# Patient Record
Sex: FEMALE | Race: BLACK OR AFRICAN AMERICAN | NOT HISPANIC OR LATINO | Employment: FULL TIME | URBAN - METROPOLITAN AREA
[De-identification: names, ages, dates, MRNs, and addresses within clinical notes are randomized per-mention and may not be internally consistent; named-entity substitution may affect disease eponyms.]

---

## 2019-04-27 ENCOUNTER — OFFICE VISIT (OUTPATIENT)
Dept: URGENT CARE | Facility: URGENT CARE | Age: 19
End: 2019-04-27
Payer: COMMERCIAL

## 2019-04-27 VITALS
SYSTOLIC BLOOD PRESSURE: 123 MMHG | WEIGHT: 293 LBS | DIASTOLIC BLOOD PRESSURE: 65 MMHG | OXYGEN SATURATION: 98 % | HEART RATE: 90 BPM | TEMPERATURE: 98.8 F

## 2019-04-27 DIAGNOSIS — B37.2 CANDIDIASIS OF SKIN: Primary | ICD-10-CM

## 2019-04-27 PROCEDURE — 99203 OFFICE O/P NEW LOW 30 MIN: CPT | Performed by: FAMILY MEDICINE

## 2019-04-27 RX ORDER — NYSTATIN 100000 U/G
CREAM TOPICAL 2 TIMES DAILY
Qty: 30 G | Refills: 11 | Status: SHIPPED | OUTPATIENT
Start: 2019-04-27 | End: 2019-05-05

## 2019-04-28 NOTE — PATIENT INSTRUCTIONS
Keep the belly button area as clean and as dry as possible.      follow up with your primary care provider if not better in 2 weeks.

## 2019-04-28 NOTE — PROGRESS NOTES
SUBJECTIVE:   Korin Jason is a 18 year old female presenting with a chief complaint of yellowish pus-like, malodorous discharge from the navel.  No blood.  There has been redness.  No pain.    Onset of symptoms was today.   Course of illness is still present.    Patient denied any new clothes.  Patient doesn't pick at her belly button. No recent cuts/scratches at the belly button.  .      Past Medical History:  No major medical problems.     No current outpatient medications on file.     Social History     Tobacco Use     Smoking status: Current Some Day Smoker     Smokeless tobacco: Never Used   Substance Use Topics     Alcohol use: Not on file       ROS:  INTEGUMENTARY/SKIN: yellow, malodorous discharge from the belly button.     OBJECTIVE:  /65   Pulse 90   Temp 98.8  F (37.1  C) (Oral)   Wt 144.7 kg (319 lb)   SpO2 98%   Breastfeeding? No   GENERAL APPEARANCE: healthy, alert and no distress  ABDOMEN: the umbilicus has macerated skin with confluent erythema and no red streaks.  No obvious discharge was seen (patient had already removed the discharge from the umbilicus before the exam.)    ASSESSMENT:  Candidiasis    PLAN:  Rx:  Nystatin Cream  See orders in Epic  follow up with the primary care provider if not better in 2 weeks.     Jj Gil MD

## 2019-05-05 ENCOUNTER — OFFICE VISIT (OUTPATIENT)
Dept: URGENT CARE | Facility: URGENT CARE | Age: 19
End: 2019-05-05
Payer: COMMERCIAL

## 2019-05-05 VITALS
TEMPERATURE: 97.9 F | WEIGHT: 293 LBS | DIASTOLIC BLOOD PRESSURE: 64 MMHG | OXYGEN SATURATION: 97 % | SYSTOLIC BLOOD PRESSURE: 120 MMHG | HEART RATE: 88 BPM

## 2019-05-05 DIAGNOSIS — R07.0 THROAT PAIN: ICD-10-CM

## 2019-05-05 DIAGNOSIS — J02.0 STREP THROAT: Primary | ICD-10-CM

## 2019-05-05 LAB
DEPRECATED S PYO AG THROAT QL EIA: ABNORMAL
SPECIMEN SOURCE: ABNORMAL

## 2019-05-05 PROCEDURE — 99213 OFFICE O/P EST LOW 20 MIN: CPT | Performed by: PHYSICIAN ASSISTANT

## 2019-05-05 PROCEDURE — 87880 STREP A ASSAY W/OPTIC: CPT | Performed by: PHYSICIAN ASSISTANT

## 2019-05-05 RX ORDER — OMEGA-3 FATTY ACIDS/FISH OIL 300-1000MG
200 CAPSULE ORAL EVERY 4 HOURS PRN
COMMUNITY

## 2019-05-05 RX ORDER — AZITHROMYCIN 250 MG/1
TABLET, FILM COATED ORAL
Qty: 6 TABLET | Refills: 0 | Status: SHIPPED | OUTPATIENT
Start: 2019-05-05 | End: 2019-12-09

## 2019-05-05 NOTE — PROGRESS NOTES
SUBJECTIVE:    Korin Jason presents to [facility] [place] for evaluation of ST x 4 days duration.  No fever. Mild stuffy nose. No other acute illness sxs.     Still able to take in PO fluids and solids despite c/o ST.    No known close contact illness exposure.     ROS:     HEENT: Positive ST and mild stuffy noseas per above congestion. No other ENT sxs.   RESP: No  Cough, wheezing or SOB  GI: Denies any N/V/D. No abdominal pain. Normal BM's  SKIN: Denies rash  NEURO:No HA, neck stiffness or lethargy.       No past medical history on file.    Current Outpatient Medications   Medication     azithromycin (ZITHROMAX) 250 MG tablet     ibuprofen (ADVIL/MOTRIN) 200 MG capsule     No current facility-administered medications for this visit.      Allergies   Allergen Reactions     Augmentin            OBJECTIVE:  /64   Pulse 88   Temp 97.9  F (36.6  C) (Tympanic)   Wt 142.9 kg (315 lb)   SpO2 97%         General appearance: alert and no apparent distress  Skin color is pink and without rash.  HEENT:   Conjunctiva not injected.  Sclera clear.  Left TM is normal: no effusions, no erythema, and normal landmarks.  Right TM is normal: no effusions, no erythema, and normal landmarks.  Nasal mucosa is normal.  Oropharyngeal exam is positive for mild to moderate erythema.  Uvula midline. No lesions, adenopathy, plaque or exudate.  Neck is supple, FROM. No neck stiffness. No adenopathy  CARDIAC:NORMAL - regular rate and rhythm without murmur.  RESP: Normal - CTA without rales, rhonchi, or wheezing.  NEURO: Alert and oriented.  Normal speech and mentation.  CN II/XII grossly intact.  Gait within normal limits.        LAB:     Results for orders placed or performed in visit on 05/05/19   Rapid strep screen   Result Value Ref Range    Specimen Description Throat     Rapid Strep A Screen (A)      POSITIVE: Group A Streptococcal antigen detected by immunoassay.           ASSESSMENT/PLAN:       (J02.0) Strep throat  (primary  "encounter diagnosis)  Comment: Augmentin Allergy--hives and possible throat swelling in childhood   Plan: azithromycin (ZITHROMAX) 250 MG tablet    1. Abx as per above   2. Ibuprofen or Tylenol prn pain   3. Follow up with PCP or UC if sxs change, worsen or fail to resolve with above tx.  4.  In addition to the above, Strep Pharyngitis \"red flag\" signs and sxs are reviewed with pt both verbally and by way of printed educational material for home review.  Pt verbalizes understanding of and agrees to the above plan.           (R07.0) Throat pain  Plan: Rapid strep screen            "

## 2019-05-05 NOTE — PATIENT INSTRUCTIONS
Patient Education     Pharyngitis: Strep (Confirmed)    You have had a positive test for strep throat. Strep throat is a contagious illness. It is spread by coughing, kissing or by touching others after touching your mouth or nose. Symptoms include throat pain that is worse with swallowing, aching all over, headache, and fever. It is treated with antibiotic medicine. This should help you start to feel better in 1 to 2 days.  Home care    Rest at home. Drink plenty of fluids to you won't get dehydrated.    No work or school for the first 2 days of taking the antibiotics. After this time, you will not be contagious. You can then return to school or work if you are feeling better.     Take antibiotic medicine for the full 10 days, even if you feel better. This is very important to ensure the infection is treated. It is also important to prevent medicine-resistant germs from developing. If you were given an antibiotic shot, you don't need any more antibiotics.    You may use acetaminophen or ibuprofen to control pain or fever, unless another medicine was prescribed for this. Talk with your healthcare provider before taking these medicines if you have chronic liver or kidney disease. Also talk with your healthcare provider if you have had a stomach ulcer or GI bleeding.    Throat lozenges or sprays help reduce pain. Gargling with warm saltwater will also reduce throat pain. Dissolve 1/2 teaspoon of salt in 1 glass of warm water. This may be useful just before meals.     Soft foods are OK. Don't eat salty or spicy foods.  Follow-up care  Follow up with your healthcare provider or our staff if you don't get better over the next week.  When to seek medical advice  Call your healthcare provider right away if any of these occur:    Fever of 100.4 F (38 C) or higher, or as directed by your healthcare provider    New or worsening ear pain, sinus pain, or headache    Painful lumps in the back of neck    Stiff neck    Lymph  nodes getting larger or becoming soft in the middle    You can't swallow liquids or you can't open your mouth wide because of throat pain    Signs of dehydration. These include very dark urine or no urine, sunken eyes, and dizziness.    Trouble breathing or noisy breathing    Muffled voice    Rash  Prevention  Here are steps you can take to help prevent an infection:    Keep good hand washing habits.    Don t have close contact with people who have sore throats, colds, or other upper respiratory infections.    Don t smoke, and stay away from secondhand smoke.  Date Last Reviewed: 11/1/2017 2000-2018 [employer]. [address] [place] [address], [place] [address]. All rights reserved. This information is not intended as a substitute for professional medical care. Always follow your healthcare professional's instructions.

## 2019-11-16 ENCOUNTER — OFFICE VISIT (OUTPATIENT)
Dept: URGENT CARE | Facility: URGENT CARE | Age: 19
End: 2019-11-16
Payer: COMMERCIAL

## 2019-11-16 ENCOUNTER — ANCILLARY PROCEDURE (OUTPATIENT)
Dept: GENERAL RADIOLOGY | Facility: CLINIC | Age: 19
End: 2019-11-16
Attending: FAMILY MEDICINE
Payer: COMMERCIAL

## 2019-11-16 VITALS
HEART RATE: 104 BPM | TEMPERATURE: 97.4 F | SYSTOLIC BLOOD PRESSURE: 112 MMHG | WEIGHT: 293 LBS | OXYGEN SATURATION: 96 % | DIASTOLIC BLOOD PRESSURE: 62 MMHG

## 2019-11-16 DIAGNOSIS — M25.561 ACUTE PAIN OF RIGHT KNEE: Primary | ICD-10-CM

## 2019-11-16 PROCEDURE — 99214 OFFICE O/P EST MOD 30 MIN: CPT | Performed by: FAMILY MEDICINE

## 2019-11-16 PROCEDURE — 73562 X-RAY EXAM OF KNEE 3: CPT | Mod: RT

## 2019-11-16 NOTE — PROGRESS NOTES
SUBJECTIVE:  Chief Complaint   Patient presents with     Urgent Care     Knee Pain     right knee pain began yesterday      Korin Jason is a 19 year old female who presents with a chief complaint of right knee pain.  Symptoms began 1 day(s) ago, are moderate and sudden onset and worsening  Context:  Injury:No.  Patient was driving a lot and just noted that was having pain.  Patient thought it was because she had not stretch it out.  The driving was not anything unusual for her.  Patient tried ice area.  No prior knee injuries.  Patient states that hurts to bear weight.  Pain on medial aspect and on patella area.    No past medical history on file.  Current Outpatient Medications   Medication Sig Dispense Refill     azithromycin (ZITHROMAX) 250 MG tablet Two tablets first day, then one tablet daily for four days. (Patient not taking: Reported on 11/16/2019) 6 tablet 0     ibuprofen (ADVIL/MOTRIN) 200 MG capsule Take 200 mg by mouth every 4 hours as needed for fever       Social History     Tobacco Use     Smoking status: Current Some Day Smoker     Smokeless tobacco: Never Used   Substance Use Topics     Alcohol use: Not on file       ROS:  Review of systems negative except as stated above.    EXAM:   /62 (BP Location: Right arm)   Pulse 104   Temp 97.4  F (36.3  C) (Tympanic)   Wt (!) 155.1 kg (342 lb)   SpO2 96%   M/S Exam:right knee - mild tenderness medial collateral and anterior patella, mild swelling, decrease ROM.  No tenderness in popliteal area  GENERAL APPEARANCE: healthy, alert and no distress  EXTREMITIES: peripheral pulses normal  SKIN: no suspicious lesions or rashes  PSYCH: alert, affect flat    X-RAY was done - right knee - no acute fracture personally viewed by me    ASSESSMENT/PLAN:  (M25.561) Acute pain of right knee  (primary encounter diagnosis)  Plan: XR Knee Right 3 Views, ORTHO          REFERRAL, order for DME            Reassurance given, most likely minor strain but will  not be able to rule out meniscus or ligamental problems with Xray, reviewed symptomatic treatment with tylenol, ibuprofen, rest, ice, elevation.  Ace wrap for comfort, DME for crutches for non-weight bearing.  Will follow up on formal Xray report and notify if any abnormalities.  Refer to [facility] for further evaluation.    Follow up with primary provider or [facility] within 1 week    Hussain Alejandra MD, MD  November 16, 2019 3:26 PM

## 2019-11-16 NOTE — PATIENT INSTRUCTIONS
Okay to take ibuprofen 200 mg - 4 tablets (800 mg) every 8 hours as needed or aleve 220 mg - 2 tablets (440 mg) every 12 hours, as needed  Okay to take tylenol 500 mg - 2 tablets (1000 mg) every 6-8 hours as needed, do not exceed 3000 mg in 24 hours.  Ace wrap, rest, ice, elevation.  Use crutches for non-weight bearing until symptoms improve    Follow up primary provider or [facility] within 1 week        Patient Education     Knee Pain  Knee pain is very common. It s especially common in active people who put a lot of pressure on their knees, like runners. It affects women more often than men.  Your kneecap (patella) is a thick, round bone. It covers and protects the front portion of your knee joint. It moves along a groove in your thighbone (femur) as part of the patellofemoral joint. A layer of cartilage surrounds the underside of your kneecap. This layer protects it from grinding against your femur.  When this cartilage softens and breaks down, it can cause knee pain. This is partly because of repetitive stress. The stress irritates the lining of the joint. This causes pain in the underlying bone.  What causes knee pain?  Many things can cause knee pain. You may have more than one cause. Some of these include:    Overuse of the knee joint    The kneecap doesn t line up with the tissue around it    Damage to small nerves in the area    Damage to the ligament-like structure that holds the kneecap in place (retinaculum)    Breakdown of the bone under the cartilage    Swelling in the soft tissues around the kneecap    Injury  You might be more likely to have knee pain if you:    Exercise a lot    Recently increased the intensity of your workouts    Have a body mass index (BMI) greater than 25    Have poor alignment of your kneecap    Walk with your feet turned overly outward or inward    Have weakness in surrounding muscle groups (inner quad or hip adductor muscles)    Have too much tightness in surrounding muscle groups  (hamstrings or iliotibial band)    Have a recent history of injury to the area    Are female  Symptoms of knee pain  This type of knee pain is a dull, aching pain in the front of the knee in the area under and around the kneecap. This pain may start quickly or slowly. Your pain might be worse when you squat, run, or sit for a long time. You might also sometimes feel like your knee is giving out. You may have symptoms in one or both of your knees.  Diagnosing knee pain  Your healthcare provider will ask about your medical history and your symptoms. Be sure to describe any activities that make your knee pain worse. He or she will look at your knee. This will include tests of your range of motion, strength, and areas of pain of your knee. Your knee alignment will be checked.  Your healthcare provider will need to rule out other causes of your knee pain, such as arthritis. You may need an imaging test, such as an X-ray or MRI.  Treatment for knee pain  Treatments that can help ease your symptoms may include:    Avoiding activities for a while that make your pain worse, returning to activity over time    Icing the outside of your knee when it causes you pain    Taking over-the-counter pain medicine    Wearing a knee brace or taping your knee to support it    Wearing special shoe inserts to help keep your feet in the proper alignment    Doing special exercises to stretch and strengthen the muscles around your hip and your knee  These steps help most people manage knee pain. But some cases of knee pain need to be treated with surgery. You may need surgery right away. Or you may need it later if other treatments don t work. Your healthcare provider may refer you to an orthopedic surgeon. He or she will talk with you about your choices.  Preventing knee pain  Losing weight and correcting excess muscle tightness or muscle weakness may help lower your risk.  In some cases, you can prevent knee pain. To help prevent a flare-up  of knee pain, you do these things:    Regularly do all the exercises your doctor or physical therapist advises    Support your knee as advised by your doctor or physical therapist    Increase training gradually, and ease up on training when needed    Have an expert check your gait for running or other sporting activities    Stretch properly before and after exercise    Replace your running shoes regularly    Lose excess weight     When to call your healthcare provider  Call your healthcare provider right away if:    Your symptoms don t get better after a few weeks of treatment    You have any new symptoms   Date Last Reviewed: 4/1/2017 2000-2018 [employer]. [address] [place] [address]. All rights reserved. This information is not intended as a substitute for professional medical care. Always follow your healthcare professional's instructions.

## 2019-12-09 ENCOUNTER — OFFICE VISIT (OUTPATIENT)
Dept: URGENT CARE | Facility: URGENT CARE | Age: 19
End: 2019-12-09
Payer: COMMERCIAL

## 2019-12-09 VITALS
TEMPERATURE: 97.8 F | BODY MASS INDEX: 45.99 KG/M2 | RESPIRATION RATE: 16 BRPM | SYSTOLIC BLOOD PRESSURE: 118 MMHG | HEIGHT: 67 IN | HEART RATE: 85 BPM | WEIGHT: 293 LBS | DIASTOLIC BLOOD PRESSURE: 66 MMHG | OXYGEN SATURATION: 98 %

## 2019-12-09 DIAGNOSIS — D17.21 LIPOMA OF RIGHT FOREARM: Primary | ICD-10-CM

## 2019-12-09 PROCEDURE — 99213 OFFICE O/P EST LOW 20 MIN: CPT | Performed by: PHYSICIAN ASSISTANT

## 2019-12-09 ASSESSMENT — MIFFLIN-ST. JEOR: SCORE: 2328.52

## 2019-12-09 ASSESSMENT — PAIN SCALES - GENERAL: PAINLEVEL: MODERATE PAIN (5)

## 2020-01-03 ENCOUNTER — OFFICE VISIT (OUTPATIENT)
Dept: URGENT CARE | Facility: URGENT CARE | Age: 20
End: 2020-01-03

## 2020-01-03 VITALS
SYSTOLIC BLOOD PRESSURE: 112 MMHG | HEART RATE: 76 BPM | WEIGHT: 293 LBS | DIASTOLIC BLOOD PRESSURE: 72 MMHG | BODY MASS INDEX: 52.78 KG/M2 | OXYGEN SATURATION: 100 % | TEMPERATURE: 98.6 F

## 2020-01-03 DIAGNOSIS — L08.9 INFECTED LACERATION: Primary | ICD-10-CM

## 2020-01-03 DIAGNOSIS — T14.8XXA INFECTED LACERATION: Primary | ICD-10-CM

## 2020-01-03 PROCEDURE — 99213 OFFICE O/P EST LOW 20 MIN: CPT | Performed by: PHYSICIAN ASSISTANT

## 2020-01-03 RX ORDER — SULFAMETHOXAZOLE/TRIMETHOPRIM 800-160 MG
1 TABLET ORAL 2 TIMES DAILY
Qty: 14 TABLET | Refills: 0 | Status: SHIPPED | OUTPATIENT
Start: 2020-01-03 | End: 2020-01-21

## 2020-01-03 NOTE — PROGRESS NOTES
Continue with statin therapy   SUBJECTIVE:  Chief Complaint   Patient presents with     Urgent Care     Work Comp     Fell at work and cut left knee on 12/31. Has yellow pus coming out.      Korin Jason is a 19 year old female presents with a chief complaint of left knee pain.  The injury occurred 4 day(s) ago.   The injury happened while at work. How: Patient was walking up the stairs at work on 12/31. She got to the top of the stairs and slipped on her sock. She hit her left knee on the stair. She reported having some pain at the time, but has since improved. She has been using bacitracin / Neosporin on the area. Today, she noted surrounding redness and some green pus like discharge.   She denies having fever, chills.     No past medical history on file.  Current Outpatient Medications   Medication Sig Dispense Refill     ibuprofen (ADVIL/MOTRIN) 200 MG capsule Take 200 mg by mouth every 4 hours as needed for fever       Social History     Tobacco Use     Smoking status: Current Some Day Smoker     Smokeless tobacco: Never Used   Substance Use Topics     Alcohol use: Not on file       ROS:  Review of systems negative except as stated above.    EXAM:   /72   Pulse 76   Temp 98.6  F (37  C) (Tympanic)   Wt (!) 152.9 kg (337 lb)   SpO2 100%   BMI 52.78 kg/m    Gen: healthy,alert,no distress  Extremity: Left knee has laceration. Small amount of surrounding erythema and TTP.    There is not compromise to the distal circulation.  Pulses are +2 and CRT is brisk  GENERAL APPEARANCE: healthy, alert and no distress  EXTREMITIES: peripheral pulses normal  SKIN: no suspicious lesions or rashes  NEURO: Normal strength and tone, sensory exam grossly normal, mentation intact and speech normal    X-RAY was not done.    ASSESSMENT / PLAN:  1. Infected laceration  Early infected lacteration  Continue to clean with soap and water   - sulfamethoxazole-trimethoprim (BACTRIM DS/SEPTRA DS) 800-160 MG tablet; Take 1 tablet by mouth 2 times daily for 7 days   Dispense: 14 tablet; Refill: 0    Diagnosis and treatment plan was reviewed with patient and/or family.   We went over any labs or imaging. Discussed worsening symptoms or little to no relief despite treatment plan to follow-up with PCP or return to clinic.  Patient verbalizes understanding. All questions were addressed and answered.   Malena Jimenez PA-C

## 2020-01-03 NOTE — PATIENT INSTRUCTIONS
Patient Education     Infected Laceration, Not Stitched  A laceration is a cut through the skin. The cut has become infected. Because of the infection, and the amount of time that has passed since injury, the wound cannot be closed. It will heal best if left open and cleaned daily. It will seal over by growing new tissue from the sides and the bottom of the wound. Antibiotics may be prescribed. You will probably have a scar after it has healed.   Oral antibiotic medicine may be prescribed to treat the infection.  Home care    If antibiotics have been prescribed, take them exactly as directed. Don't t stop taking them until they are gone or you are told to stop, even if you feel better.     Follow the healthcare provider s instructions on how to care for the cut.    Unless otherwise instructed, change the bandage twice a day for the first few days, until the drainage stops. Then change it once a day. Change the bandage if it becomes wet, stained with wound fluid, or dirty.    Clean the wound daily:  ? After removing the bandage, gently wash the area with soap and water. Use a wet cotton swab to loosen and remove any blood or crust that forms.  ? After cleaning, apply a thin layer of over-the-counter antibiotic ointment if advised. Reapply a fresh bandage.    Follow the healthcare provider's instructions for keeping the wound dry. You may be given restrictions on showering or tub baths.    If the bandage gets wet, remove it. Gently pat the wound dry with a clean cloth, then replace the wet bandage with a dry one.    Don't scratch, rub, or pick at the area.    Wash your hands with soap and warm water before and after cleaning the wound or changing the bandage.    Follow-up care  Follow up with your healthcare provider, or as advised. It is important to follow up to make sure the infection is getting better.  When to seek medical advice  Call your healthcare provider right away if any of these occur:    Symptoms don't  begin to improve or get worse    Red streaks spread from the wound    Increase in pus coming from the wound    Increase in pain    Fever of 100.4 F (38 C) or higher, or as directed by your healthcare provider  Date Last Reviewed: 7/1/2017 2000-2019 [employer]. [address] [place] [id number]. All rights reserved. This information is not intended as a substitute for professional medical care. Always follow your healthcare professional's instructions.

## 2020-01-04 NOTE — PROGRESS NOTES
SUBJECTIVE:  Korin Jason is a 19 year old female who comes in for what she noted to be a small lump under her skin on the right forearm.  Little tender to the touch but keeps pushing on it.  Noticed yesterday.  No trauma that aware of.  No redness or drainage surrounding area.  No other sx or concerns  Denies numbness or tingling in hand    PMH generally healthy    MED takes no daily med    Social History     Socioeconomic History     Marital status: Single     Spouse name: Not on file     Number of children: Not on file     Years of education: Not on file     Highest education level: Not on file   Occupational History     Not on file   Social Needs     Financial resource strain: Not on file     Food insecurity:     Worry: Not on file     Inability: Not on file     Transportation needs:     Medical: Not on file     Non-medical: Not on file   Tobacco Use     Smoking status: Current Some Day Smoker     Smokeless tobacco: Never Used   Substance and Sexual Activity     Alcohol use: Not on file     Drug use: Not on file     Sexual activity: Not on file   Lifestyle     Physical activity:     Days per week: Not on file     Minutes per session: Not on file     Stress: Not on file   Relationships     Social connections:     Talks on phone: Not on file     Gets together: Not on file     Attends Religion service: Not on file     Active member of club or organization: Not on file     Attends meetings of clubs or organizations: Not on file     Relationship status: Not on file     Intimate partner violence:     Fear of current or ex partner: Not on file     Emotionally abused: Not on file     Physically abused: Not on file     Forced sexual activity: Not on file   Other Topics Concern     Not on file   Social History Narrative     Not on file     ROS  Negative other than stated above    Exam:  GENERAL APPEARANCE: healthy, alert and no distress  EYES: EOMI,  PERRL  MS: extremities normal- no gross deformities noted, no evidence  of inflammation in joints, FROM in all extremities.  SKIN: right forearm with marble sized lipoma under skin. No overlying redness or signs of infection  NEURO: Normal strength and tone, sensory exam grossly normal, mentation intact and speech normal    assessment/plan:  (D17.21) Lipoma of right forearm  (primary encounter diagnosis)  Comment:   Plan:   Reassured at this time and no intervention needed.  If increased in size or pain then consider removal by Derm.  Follow-up as needed

## 2020-01-21 ENCOUNTER — OFFICE VISIT (OUTPATIENT)
Dept: OBGYN | Facility: CLINIC | Age: 20
End: 2020-01-21
Payer: COMMERCIAL

## 2020-01-21 ENCOUNTER — RESULT FOLLOW UP (OUTPATIENT)
Dept: OBGYN | Facility: CLINIC | Age: 20
End: 2020-01-21

## 2020-01-21 VITALS
BODY MASS INDEX: 47.09 KG/M2 | DIASTOLIC BLOOD PRESSURE: 68 MMHG | WEIGHT: 293 LBS | HEIGHT: 66 IN | SYSTOLIC BLOOD PRESSURE: 128 MMHG

## 2020-01-21 DIAGNOSIS — J30.2 SEASONAL ALLERGIC RHINITIS, UNSPECIFIED TRIGGER: ICD-10-CM

## 2020-01-21 DIAGNOSIS — Z30.46 ENCOUNTER FOR REMOVAL OF SUBDERMAL CONTRACEPTIVE IMPLANT: ICD-10-CM

## 2020-01-21 DIAGNOSIS — Z01.419 ENCOUNTER FOR WELL WOMAN EXAM WITH ROUTINE GYNECOLOGICAL EXAM: Primary | ICD-10-CM

## 2020-01-21 DIAGNOSIS — J45.909 ASTHMA, UNSPECIFIED ASTHMA SEVERITY, UNSPECIFIED WHETHER COMPLICATED, UNSPECIFIED WHETHER PERSISTENT: ICD-10-CM

## 2020-01-21 PROBLEM — E66.01 MORBID OBESITY (H): Status: ACTIVE | Noted: 2020-01-21

## 2020-01-21 PROCEDURE — G0145 SCR C/V CYTO,THINLAYER,RESCR: HCPCS | Performed by: ADVANCED PRACTICE MIDWIFE

## 2020-01-21 PROCEDURE — 11982 REMOVE DRUG IMPLANT DEVICE: CPT | Performed by: ADVANCED PRACTICE MIDWIFE

## 2020-01-21 PROCEDURE — 99385 PREV VISIT NEW AGE 18-39: CPT | Mod: 25 | Performed by: ADVANCED PRACTICE MIDWIFE

## 2020-01-21 PROCEDURE — 87591 N.GONORRHOEAE DNA AMP PROB: CPT | Performed by: ADVANCED PRACTICE MIDWIFE

## 2020-01-21 PROCEDURE — G0124 SCREEN C/V THIN LAYER BY MD: HCPCS | Performed by: ADVANCED PRACTICE MIDWIFE

## 2020-01-21 PROCEDURE — 99212 OFFICE O/P EST SF 10 MIN: CPT | Mod: 25 | Performed by: ADVANCED PRACTICE MIDWIFE

## 2020-01-21 RX ORDER — ALBUTEROL SULFATE 90 UG/1
2 AEROSOL, METERED RESPIRATORY (INHALATION) PRN
COMMUNITY
End: 2023-09-20

## 2020-01-21 RX ORDER — ALBUTEROL SULFATE 90 UG/1
2 AEROSOL, METERED RESPIRATORY (INHALATION) EVERY 6 HOURS PRN
Qty: 1 INHALER | Refills: 0 | Status: SHIPPED | OUTPATIENT
Start: 2020-01-21 | End: 2021-03-12 | Stop reason: ALTCHOICE

## 2020-01-21 ASSESSMENT — MIFFLIN-ST. JEOR: SCORE: 2297.33

## 2020-01-21 NOTE — NURSING NOTE
"Chief Complaint   Patient presents with     Physical     Procedure     Nexplanon Removal. Implant was placed in 2017     Contraception     Declines new nexplanon or other contraceptives       Initial /68 (BP Location: Right arm, Cuff Size: Adult Large)   Ht 1.67 m (5' 5.75\")   Wt (!) 152 kg (335 lb)   Breastfeeding No   BMI 54.48 kg/m   Estimated body mass index is 54.48 kg/m  as calculated from the following:    Height as of this encounter: 1.67 m (5' 5.75\").    Weight as of this encounter: 152 kg (335 lb).  BP completed using cuff size: regular    Questioned patient about current smoking habits.  Pt. currently smokes.  Advised about smoking cessation.          The following HM Due: pap smear  [place] ()      Isrrael Chanel CMA           "

## 2020-01-21 NOTE — PROGRESS NOTES
"Korin is a 19 year old  female who presents for annual exam and initial Pap smear. She is accompanied by her mother today.    Besides routine health maintenance, she would like her Nexplanon removed.  HPI:  Nexplanon placed 2017, due for removal today. Pt states she had a rough first year with it due to severe cramping but has not had issues the last two years. However, she is not interested in replacing this today and has decided not to pursue other contraceptive methods at this time.  Over the course of our visit, Ms. Jason brought up her history of asthma and increased postnasal drainage and mucous production. She is currently getting over a mild URI but she and mother state rhinorrhea is constant throughout the year. She has not noticed a pattern with seasons, although this and/or environmental triggers could be the underlying cause. She uses an albuterol inhaler very occasionally. Denies exacerbations in asthma symptoms necessitating urgent care/emergency services.  She uses an albuterol inhaler very occasionally. Most notices symptoms with cold, winter air. Able to comfortably exercise without breathing issues.    REPRODUCTIVE/GYNECOLOGIC HISTORY:  Menses are none with Nexplanon. States history of irregular, painful periods prior to Nexplanon. During the last year, she has noticed she regularly experiences cramps once a month \"as if I'm having a period\" but has had no flow.    No LMP recorded. (Menstrual status: Birth Control)..   Plans on abstinence once Nexplanon is removed.  She is not currently considering pregnancy.    Korin has been sexually active with two male partner(s) in the last year and is not currently in a sexual relationship.   STI testing offered?  Accepted  History of abnormal Pap smear:  No  SOCIAL HISTORY  Abuse: does not report having previously been physical or sexually abused.    Do you feel safe in your environment? YES     She reports that she has been smoking for one " year. She has never used smokeless tobacco.   What types? Cigarettes  How often? Occasionally in social settings up to daily  How much/many packs or cans per day? One cigarette at most  Discuss non-pharmacologic options for smoking cessation,      Distraction activities; pt feels she has adequate self-restraint    Tobacco Cessation Action Plan: Information offered: Patient not interested at this time Verbal counseling provided.       Last PHQ-9 score on record = No flowsheet data found.  Last GAD7 score on record = No flowsheet data found.  Alcohol Score = 0    HEALTH MAINTENANCE:  Cholesterol: (No results found for: CHOL History of abnormal lipids: No  Mammo: N/A . History of abnormal Mammo: Not applicable.  Regular Self Breast Exams: Yes  TSH: (No results found for: TSH ) N/A  Pap; (No results found for: PAP )  Immunizations up to date: no - has completed Gardasil series, will need TDaP within the next year; declines influenza today as she currently has a URI  Health maintenance updated:  yes    HEALTHY HABITS  Eating habits: eats at irregular times - one big meal and frequent, small snacks throughout the day  Calcium/Vitamin D intake: source: dairy Adequate? Yes  Exercise: How often do you exercise? 1-3 times/week; Cardio and Strength Training  Have you had an eye exam in the last two years? YES, wears glasses     Do you routinely see the dentist once or twice yearly? NO - has not established care since moving to the area one year ago      HISTORY:  OB History    Para Term  AB Living   0 0 0 0 0 0   SAB TAB Ectopic Multiple Live Births   0 0 0 0 0     Past Medical History:   Diagnosis Date     Asthma      Morbid obesity (H)      Tobacco use      Past Surgical History:   Procedure Laterality Date     GALLBLADDER SURGERY       HC REMOVE TONSILS/ADENOIDS,<11 Y/O  2005     wisdom Bilateral     All 4 removed     Family History   Problem Relation Age of Onset     Asthma Mother      Kidney Disease  "Father      Breast Cancer Maternal Grandmother         47 at first diagnosis, had mastectomy; returned 12 years later with lymph involvement     Lymphoma Maternal Grandmother      Uterine Cancer Cousin         mid-30's, in remission     Social History     Tobacco Use     Smoking status: Current Every Day Smoker     Smokeless tobacco: Never Used   Substance Use Topics     Alcohol use: Yes     Drug use: Never     History   Sexual Activity     Sexual activity: Yes     Partners: Male       Current Outpatient Medications:      albuterol (PROAIR HFA/PROVENTIL HFA/VENTOLIN HFA) 108 (90 Base) MCG/ACT inhaler, Inhale 2 puffs into the lungs as needed for shortness of breath / dyspnea or wheezing, Disp: , Rfl:      ibuprofen (ADVIL/MOTRIN) 200 MG capsule, Take 200 mg by mouth every 4 hours as needed for fever, Disp: , Rfl:      Allergies   Allergen Reactions     Augmentin        Past medical, surgical, social and family history were reviewed and updated in [facility].    ROS:   CONSTITUTIONAL: NEGATIVE for fever, chills, change in weight  INTEGUMENTARU/SKIN: NEGATIVE for worrisome rashes, moles or lesions  EYES: NEGATIVE for vision changes or irritation  ENT: POSITIVE for frequent postnasal drainage and clear rhinorrhea besides current URI symptoms of congestion, dry throat, and mild cough  RESP: NEGATIVE for significant cough or SOB  BREAST: NEGATIVE for masses, tenderness or discharge  CV: NEGATIVE for chest pain, palpitations or peripheral edema  GI: NEGATIVE for nausea, abdominal pain, heartburn, or change in bowel habits  : NEGATIVE for unusual urinary or vaginal symptoms. Periods are regular.  MUSCULOSKELETAL: NEGATIVE for significant arthralgias or myalgia  NEURO: NEGATIVE for weakness, dizziness or paresthesias  PSYCHIATRIC: NEGATIVE for changes in mood or affect    PHYSICAL EXAM:  /68 (BP Location: Right arm, Cuff Size: Adult Large)   Ht 1.67 m (5' 5.75\")   Wt (!) 152 kg (335 lb)   Breastfeeding No   BMI 54.48 " kg/m     BMI: Body mass index is 54.48 kg/m .  Constitutional: healthy, alert and no distress  Neck: symmetrical, thyroid normal size, no masses present, no lymphadenopathy present.   Cardiovascular: RRR, no murmurs present  Respiratory: breathing unlabored, lungs CTA bilaterally  Breast: normal without masses, tenderness or nipple discharge and no palpable axillary masses or adenopathy  Gastrointestinal: abdomen soft, non-tender, bowel sounds present  PELVIC EXAM:  Vulva: No lesions, no adenopathy, BUS WNL  Vagina: Moist, pink, discharge normal  well rugated, no lesions  Cervix: Smooth, pink, no visible lesions  Uterus: Normal size, non-tender, mobile  Ovaries: No masses palpated  Rectal exam: Deferred    ASSESSMENT/PLAN:    ICD-10-CM    1. Encounter for well woman exam with routine gynecological exam Z01.419 Pap imaged thin layer screen only - recommended age 21 - 24 years     Neisseria gonorrhoeae PCR   2. Encounter for removal of subdermal contraceptive implant Z30.46    3. Seasonal allergic rhinitis, unspecified trigger J30.2    4. Asthma, unspecified asthma severity, unspecified whether complicated, unspecified whether persistent J45.909 albuterol (PROAIR HFA/PROVENTIL HFA/VENTOLIN HFA) 108 (90 Base) MCG/ACT inhaler     No results found for any visits on 01/21/20.    1. Pap collected. Will notify pt with results.  2. See procedure note below.  3. Instructed to begin a daily antihistamine such as Claritin or Zyrtec. May also use nasal sprays such as Flonase or Nasocort as needed.  4. Albuterol refilled. Instructed to notify provider if she begins to need it more frequently/daily. Will plan to begin a maintenance medication if so. Discussed avoidance of triggers. Encouraged continued use of home humidifier and reducing tobacco use.      Return to clinic in 1 year for well woman exam. Encouraged to return earlier as needed for contraception counseling and/or further STI testing.      COUNSELING:   Reviewed  preventative health counseling.  Special attention given to:        Regular exercise       Healthy diet/nutrition       Contraception       Safe sex practices/STD prevention        Tobacco use     Tobacco Cessation Action Plan: Information offered: Patient not interested at this time  Health Maintenance: TDaP at next visit      Nexplanon Removal:     Is a pregnancy test required: No.  Was a consent obtained?  Yes    Korin Jason is here for removal of etonogestrel implant Nexplanon/Implanon    Indication: Due for removal (inserted 1/2017)    Preoperative Diagnosis: etonogestrel implant  Postoperative Diagnosis: etonogestrel implant removed    Technique: On the left arm  Skin prep Betadine  Anesthesia 1% lidocaine  Procedure: Small incision (<5mm) was made at distal end of palpable implant, curved hemostat or mosquito forceps was used to isolate the implant and bring it to the incision, the fibrous capsule containing the implant  was incised and the Implant was removed intact.    EBL: minimal  Complications: No  Tolerance: Pt tolerated procedure well and was in stable condition.   Dressing: Steri strips applied to incision and pressure bandage was placed for the next 12-24 hours.    Contraception was discussed and patient chose the following method: abstinence.    Follow up: Signs and symptoms of infection were discussed. Pt was instructed to call if bleeding, severe pain or foul smell.       NICOLE Choe CNM

## 2020-01-22 LAB
N GONORRHOEA DNA SPEC QL NAA+PROBE: NEGATIVE
SPECIMEN SOURCE: NORMAL

## 2020-01-27 PROBLEM — R87.612 PAPANICOLAOU SMEAR OF CERVIX WITH LOW GRADE SQUAMOUS INTRAEPITHELIAL LESION (LGSIL): Status: ACTIVE | Noted: 2020-01-21

## 2020-01-27 LAB
COPATH REPORT: ABNORMAL
PAP: ABNORMAL

## 2020-03-18 ENCOUNTER — NURSE TRIAGE (OUTPATIENT)
Dept: NURSING | Facility: CLINIC | Age: 20
End: 2020-03-18

## 2020-03-18 NOTE — TELEPHONE ENCOUNTER
Patient states had Nexaplon implant removed in January and yesterday reports first menses since.  Describes cramping across low abdomen and radiated to mid low back.  Using ibuprofen and alleve.   Describes pain as moderate to severe in past 24 hours.  Describes normal menstrual bleeding.   Afebrile by report.   Tolerating fluids.    Protocol-  Abdominal Pain-Menstrual Cramps  Care advice reviewed.   Disposition-  See Provider within 2 weeks  Caller states understanding of the recommended disposition.   Advised to call back if further questions or concerns.     PAULETTE Lambert RN  [facility]     Instructions for Patients  Thank you for limiting contact with others, wearing a simple mask to cover your cough, practice good hand hygiene habits and accessing our virtual services where possible to limit the spread of this virus.    For more information about COVID19 and options for caring for yourself at home, please visit the CDC website at https://www.cdc.gov/coronavirus/2019-ncov/about/steps-when-sick.html  For more options for care at [facility], please visit our website at [email or web address]   Additional Information    Negative: Sounds like a life-threatening emergency to the triager    Negative: SEVERE pain and pain clearly increases with coughing    Negative: Patient sounds very sick or weak to the triager    Negative: SEVERE vaginal bleeding (i.e., 2 pads / hour x 2 hours OR 1 pad / hour x 6 hours) and worse than ever before    Negative: Fever > 100.5 F (38.1 C)    Negative: Could be pregnant (e.g., missed last menstrual period)    Negative: Pain present > 3 days and normally menstrual cramps last 1-3 days    Negative: Pain only on one side    Negative: Unusual vaginal discharge before period began    MODERATE pain (e.g., cramps interfere with normal activities) and not relieved by ibuprofen or naproxen used per Care Advice    Protocols used: ABDOMINAL PAIN -  MENSTRUAL CRAMPS-A-OH

## 2020-11-16 ENCOUNTER — OFFICE VISIT (OUTPATIENT)
Dept: URGENT CARE | Facility: URGENT CARE | Age: 20
End: 2020-11-16
Payer: COMMERCIAL

## 2020-11-16 VITALS
WEIGHT: 293 LBS | HEART RATE: 101 BPM | TEMPERATURE: 98.2 F | SYSTOLIC BLOOD PRESSURE: 128 MMHG | DIASTOLIC BLOOD PRESSURE: 68 MMHG | OXYGEN SATURATION: 96 % | BODY MASS INDEX: 52.21 KG/M2

## 2020-11-16 DIAGNOSIS — R35.0 URINE FREQUENCY: ICD-10-CM

## 2020-11-16 DIAGNOSIS — Z32.01 PREGNANCY TEST POSITIVE: Primary | ICD-10-CM

## 2020-11-16 LAB
ALBUMIN UR-MCNC: ABNORMAL MG/DL
APPEARANCE UR: CLEAR
BACTERIA #/AREA URNS HPF: ABNORMAL /HPF
BILIRUB UR QL STRIP: NEGATIVE
COLOR UR AUTO: YELLOW
GLUCOSE UR STRIP-MCNC: NEGATIVE MG/DL
HCG UR QL: NEGATIVE
HGB UR QL STRIP: ABNORMAL
KETONES UR STRIP-MCNC: NEGATIVE MG/DL
LEUKOCYTE ESTERASE UR QL STRIP: NEGATIVE
MUCOUS THREADS #/AREA URNS LPF: PRESENT /LPF
NITRATE UR QL: NEGATIVE
NON-SQ EPI CELLS #/AREA URNS LPF: ABNORMAL /LPF
PH UR STRIP: 6 PH (ref 5–7)
RBC #/AREA URNS AUTO: ABNORMAL /HPF
SOURCE: ABNORMAL
SP GR UR STRIP: >1.03 (ref 1–1.03)
UROBILINOGEN UR STRIP-ACNC: 0.2 EU/DL (ref 0.2–1)
WBC #/AREA URNS AUTO: ABNORMAL /HPF

## 2020-11-16 PROCEDURE — 36415 COLL VENOUS BLD VENIPUNCTURE: CPT | Performed by: PHYSICIAN ASSISTANT

## 2020-11-16 PROCEDURE — 81025 URINE PREGNANCY TEST: CPT | Performed by: PHYSICIAN ASSISTANT

## 2020-11-16 PROCEDURE — 84702 CHORIONIC GONADOTROPIN TEST: CPT | Performed by: PHYSICIAN ASSISTANT

## 2020-11-16 PROCEDURE — 99213 OFFICE O/P EST LOW 20 MIN: CPT | Performed by: PHYSICIAN ASSISTANT

## 2020-11-16 PROCEDURE — 87086 URINE CULTURE/COLONY COUNT: CPT | Performed by: PHYSICIAN ASSISTANT

## 2020-11-16 PROCEDURE — 81001 URINALYSIS AUTO W/SCOPE: CPT | Performed by: PHYSICIAN ASSISTANT

## 2020-11-16 NOTE — PATIENT INSTRUCTIONS
Your pregnancy test today is negative. We are sending off blood work to look at your HCG levels. If this is greater than 5, then you are pregnant.   You have blood in your urine today, I would like you to have your urine retested in 2-4 weeks to make sure blood has resolved.

## 2020-11-16 NOTE — PROGRESS NOTES
"CHIEF COMPLAINT:   Chief Complaint   Patient presents with     Urgent Care     Pregnancy test     PT had a day long period 1 week ago on 11/9/2020. Yesterday took urine pregnancy tests that came back positive (faint lines). Took two more this am and came back negative. Pt says urinating more frequently than usual for the last week.         HPI: Korin Jason is a 20 year old female who presents to clinic today for evaluation.  Patient's last menstrual period was on 11/9/2020.  It was much lighter than normal, only lasted 1 day and was \"nonexistent \"  Over the past few days she has had some urinary frequency, and fatigue.  She took 4 pregnancy test, 2 came back positive and 2 were negative.  She is not using birth control.  And although this is an unplanned pregnancy, she would be excited.  She had endorses having urinary frequency.  Denies having fever, chills, vomiting, dysuria or hematuria.  She has never been pregnant in the past.    +nausea and fatigue   Past Medical History:   Diagnosis Date     Abnormal Pap smear of cervix 01/21/2020    See problem list     Asthma      Morbid obesity (H)      Tobacco use      Past Surgical History:   Procedure Laterality Date     GALLBLADDER SURGERY  2001     HC REMOVE TONSILS/ADENOIDS,<13 Y/O  2005     wisdom Bilateral 2015    All 4 removed     Social History     Tobacco Use     Smoking status: Current Every Day Smoker     Smokeless tobacco: Never Used   Substance Use Topics     Alcohol use: Yes     Current Outpatient Medications   Medication     albuterol (PROAIR HFA/PROVENTIL HFA/VENTOLIN HFA) 108 (90 Base) MCG/ACT inhaler     albuterol (PROAIR HFA/PROVENTIL HFA/VENTOLIN HFA) 108 (90 Base) MCG/ACT inhaler     ibuprofen (ADVIL/MOTRIN) 200 MG capsule     No current facility-administered medications for this visit.      Allergies   Allergen Reactions     Augmentin        10 point ROS of systems including Constitutional, Eyes, Respiratory, Cardiovascular, Gastroenterology, " Genitourinary, Integumentary, Muscularskeletal, Psychiatric were all negative except for pertinent positives noted in my HPI.        Exam:  /68   Pulse 101   Temp 98.2  F (36.8  C) (Tympanic)   Wt 145.6 kg (321 lb)   LMP 11/09/2020 (Exact Date)   SpO2 96%   BMI 52.21 kg/m    Constitutional: healthy, alert and no distress  Cardiovascular: RRR  Respiratory: CTA bilaterally, no rhonchi or rales  Gastrointestinal: soft and nontender  Skin: no rashes  Neurologic: Speech clear, gait normal. Moves all extremities.    Results for orders placed or performed in visit on 11/16/20   HCG qualitative urine     Status: None   Result Value Ref Range    HCG Qual Urine Negative NEG^Negative   UA reflex to Microscopic and Culture     Status: Abnormal    Specimen: Midstream Urine   Result Value Ref Range    Color Urine Yellow     Appearance Urine Clear     Glucose Urine Negative NEG^Negative mg/dL    Bilirubin Urine Negative NEG^Negative    Ketones Urine Negative NEG^Negative mg/dL    Specific Gravity Urine >1.030 1.003 - 1.035    Blood Urine Trace (A) NEG^Negative    pH Urine 6.0 5.0 - 7.0 pH    Protein Albumin Urine Trace (A) NEG^Negative mg/dL    Urobilinogen Urine 0.2 0.2 - 1.0 EU/dL    Nitrite Urine Negative NEG^Negative    Leukocyte Esterase Urine Negative NEG^Negative    Source Midstream Urine    Urine Microscopic     Status: Abnormal   Result Value Ref Range    WBC Urine 0 - 5 OTO5^0 - 5 /HPF    RBC Urine 2-5 (A) OTO2^O - 2 /HPF    Squamous Epithelial /LPF Urine Many (A) FEW^Few /LPF    Bacteria Urine Many (A) NEG^Negative /HPF    Mucous Urine Present (A) NEG^Negative /LPF         ASSESSMENT/PLAN:  1. Pregnancy test positive  Patient with 2 + and 2- tests at home. She brought in tests and a faint line is present, which she reports looked the same at 2 minutes after taking it.   No vaginal bleeding or abd pain.   Will collect hcg quant.   - HCG qualitative urine  - Urine Microscopic    2. Urine frequency  No evidence  of infection  - UA reflex to Microscopic and Culture    Malena Jimenez PA-C

## 2020-11-17 LAB — B-HCG SERPL-ACNC: <1 IU/L (ref 0–5)

## 2020-11-18 LAB
BACTERIA SPEC CULT: NORMAL
Lab: NORMAL
SPECIMEN SOURCE: NORMAL

## 2021-01-03 ENCOUNTER — HEALTH MAINTENANCE LETTER (OUTPATIENT)
Age: 21
End: 2021-01-03

## 2021-02-15 ENCOUNTER — PATIENT OUTREACH (OUTPATIENT)
Dept: OBGYN | Facility: CLINIC | Age: 21
End: 2021-02-15

## 2021-02-15 DIAGNOSIS — R87.612 PAPANICOLAOU SMEAR OF CERVIX WITH LOW GRADE SQUAMOUS INTRAEPITHELIAL LESION (LGSIL): ICD-10-CM

## 2021-03-07 ENCOUNTER — HEALTH MAINTENANCE LETTER (OUTPATIENT)
Age: 21
End: 2021-03-07

## 2021-03-12 ENCOUNTER — OFFICE VISIT (OUTPATIENT)
Dept: FAMILY MEDICINE | Facility: CLINIC | Age: 21
End: 2021-03-12
Payer: COMMERCIAL

## 2021-03-12 VITALS
RESPIRATION RATE: 18 BRPM | DIASTOLIC BLOOD PRESSURE: 54 MMHG | BODY MASS INDEX: 47.09 KG/M2 | HEART RATE: 95 BPM | TEMPERATURE: 98.6 F | HEIGHT: 66 IN | SYSTOLIC BLOOD PRESSURE: 129 MMHG | OXYGEN SATURATION: 97 % | WEIGHT: 293 LBS

## 2021-03-12 DIAGNOSIS — N92.6 IRREGULAR MENSTRUAL CYCLE: ICD-10-CM

## 2021-03-12 DIAGNOSIS — Z11.4 SCREENING FOR HIV (HUMAN IMMUNODEFICIENCY VIRUS): ICD-10-CM

## 2021-03-12 DIAGNOSIS — Z00.00 ROUTINE GENERAL MEDICAL EXAMINATION AT A HEALTH CARE FACILITY: Primary | ICD-10-CM

## 2021-03-12 DIAGNOSIS — Z11.3 SCREENING FOR STDS (SEXUALLY TRANSMITTED DISEASES): ICD-10-CM

## 2021-03-12 DIAGNOSIS — Z11.59 NEED FOR HEPATITIS C SCREENING TEST: ICD-10-CM

## 2021-03-12 DIAGNOSIS — Z12.4 SCREENING FOR MALIGNANT NEOPLASM OF CERVIX: ICD-10-CM

## 2021-03-12 LAB
BASOPHILS # BLD AUTO: 0 10E9/L (ref 0–0.2)
BASOPHILS NFR BLD AUTO: 0.4 %
DIFFERENTIAL METHOD BLD: NORMAL
EOSINOPHIL # BLD AUTO: 0.3 10E9/L (ref 0–0.7)
EOSINOPHIL NFR BLD AUTO: 2.8 %
ERYTHROCYTE [DISTWIDTH] IN BLOOD BY AUTOMATED COUNT: 12.9 % (ref 10–15)
HCG SERPL QL: NEGATIVE
HCT VFR BLD AUTO: 40.5 % (ref 35–47)
HCV AB SERPL QL IA: NONREACTIVE
HGB BLD-MCNC: 13.7 G/DL (ref 11.7–15.7)
HIV 1+2 AB+HIV1 P24 AG SERPL QL IA: NONREACTIVE
LYMPHOCYTES # BLD AUTO: 3 10E9/L (ref 0.8–5.3)
LYMPHOCYTES NFR BLD AUTO: 31.6 %
MCH RBC QN AUTO: 28.8 PG (ref 26.5–33)
MCHC RBC AUTO-ENTMCNC: 33.8 G/DL (ref 31.5–36.5)
MCV RBC AUTO: 85 FL (ref 78–100)
MONOCYTES # BLD AUTO: 0.7 10E9/L (ref 0–1.3)
MONOCYTES NFR BLD AUTO: 7.3 %
NEUTROPHILS # BLD AUTO: 5.5 10E9/L (ref 1.6–8.3)
NEUTROPHILS NFR BLD AUTO: 57.9 %
PLATELET # BLD AUTO: 374 10E9/L (ref 150–450)
RBC # BLD AUTO: 4.75 10E12/L (ref 3.8–5.2)
WBC # BLD AUTO: 9.5 10E9/L (ref 4–11)

## 2021-03-12 PROCEDURE — 90686 IIV4 VACC NO PRSV 0.5 ML IM: CPT | Performed by: FAMILY MEDICINE

## 2021-03-12 PROCEDURE — 87591 N.GONORRHOEAE DNA AMP PROB: CPT | Performed by: FAMILY MEDICINE

## 2021-03-12 PROCEDURE — G0145 SCR C/V CYTO,THINLAYER,RESCR: HCPCS | Performed by: FAMILY MEDICINE

## 2021-03-12 PROCEDURE — 84703 CHORIONIC GONADOTROPIN ASSAY: CPT | Performed by: FAMILY MEDICINE

## 2021-03-12 PROCEDURE — 85025 COMPLETE CBC W/AUTO DIFF WBC: CPT | Performed by: FAMILY MEDICINE

## 2021-03-12 PROCEDURE — 87389 HIV-1 AG W/HIV-1&-2 AB AG IA: CPT | Performed by: FAMILY MEDICINE

## 2021-03-12 PROCEDURE — 99395 PREV VISIT EST AGE 18-39: CPT | Mod: 25 | Performed by: FAMILY MEDICINE

## 2021-03-12 PROCEDURE — 36415 COLL VENOUS BLD VENIPUNCTURE: CPT | Performed by: FAMILY MEDICINE

## 2021-03-12 PROCEDURE — 87491 CHLMYD TRACH DNA AMP PROBE: CPT | Performed by: FAMILY MEDICINE

## 2021-03-12 PROCEDURE — 90471 IMMUNIZATION ADMIN: CPT | Performed by: FAMILY MEDICINE

## 2021-03-12 PROCEDURE — 87624 HPV HI-RISK TYP POOLED RSLT: CPT | Performed by: FAMILY MEDICINE

## 2021-03-12 PROCEDURE — 86803 HEPATITIS C AB TEST: CPT | Performed by: FAMILY MEDICINE

## 2021-03-12 ASSESSMENT — MIFFLIN-ST. JEOR: SCORE: 2277.25

## 2021-03-12 ASSESSMENT — ASTHMA QUESTIONNAIRES
QUESTION_4 LAST FOUR WEEKS HOW OFTEN HAVE YOU USED YOUR RESCUE INHALER OR NEBULIZER MEDICATION (SUCH AS ALBUTEROL): NOT AT ALL
QUESTION_2 LAST FOUR WEEKS HOW OFTEN HAVE YOU HAD SHORTNESS OF BREATH: ONCE OR TWICE A WEEK
QUESTION_3 LAST FOUR WEEKS HOW OFTEN DID YOUR ASTHMA SYMPTOMS (WHEEZING, COUGHING, SHORTNESS OF BREATH, CHEST TIGHTNESS OR PAIN) WAKE YOU UP AT NIGHT OR EARLIER THAN USUAL IN THE MORNING: NOT AT ALL
ACT_TOTALSCORE: 22
QUESTION_1 LAST FOUR WEEKS HOW MUCH OF THE TIME DID YOUR ASTHMA KEEP YOU FROM GETTING AS MUCH DONE AT WORK, SCHOOL OR AT HOME: A LITTLE OF THE TIME
QUESTION_5 LAST FOUR WEEKS HOW WOULD YOU RATE YOUR ASTHMA CONTROL: WELL CONTROLLED

## 2021-03-12 NOTE — PROGRESS NOTES
SUBJECTIVE:   CC: Korin Jason is an 20 year old woman who presents for preventive health visit.     In today for pap and lsil pap follow up   Patient has been advised of split billing requirements and indicates understanding: Yes  Healthy Habits:    Getting at least 3 servings of Calcium per day:  Yes    Dental care twice a year:  Yes    Sleep apnea or symptoms of sleep apnea:  Daytime drowsiness    Diet:  Regular (no restrictions)    Frequency of exercise:  2-3 days/week    Duration of exercise:  45-60 minutes    Taking medications regularly:  No    Barriers to taking medications:  Other    Medication side effects:  Not applicable    PHQ-2 Total Score:    Additional concerns today:  No      Past Medical History:   Diagnosis Date     Abnormal Pap smear of cervix 01/21/2020    See problem list     Asthma      Morbid obesity (H)      Tobacco use        Past Surgical History:   Procedure Laterality Date     GALLBLADDER SURGERY  2001     HC REMOVE TONSILS/ADENOIDS,<13 Y/O  2005     wisdom Bilateral 2015    All 4 removed       Family History   Problem Relation Age of Onset     Asthma Mother      Kidney Disease Father      Breast Cancer Maternal Grandmother         47 at first diagnosis, had mastectomy; returned 12 years later with lymph involvement     Lymphoma Maternal Grandmother      Uterine Cancer Cousin         mid-30's, in remission       Social History     Tobacco Use     Smoking status: Current Every Day Smoker     Smokeless tobacco: Never Used   Substance Use Topics     Alcohol use: Yes             Today's PHQ-2 Score:   PHQ-2 ( 1999 Pfizer) 1/21/2020   Q1: Little interest or pleasure in doing things 0   Q2: Feeling down, depressed or hopeless 0   PHQ-2 Score 0       Abuse: Current or Past (Physical, Sexual or Emotional) - no  Do you feel safe in your environment? No    Have you ever done Advance Care Planning? (For example, a Health Directive, POLST, or a discussion with a medical provider or your loved  ones about your wishes): Yes, advance care planning is on file.    Social History     Tobacco Use     Smoking status: Current Every Day Smoker     Smokeless tobacco: Never Used   Substance Use Topics     Alcohol use: Yes         No flowsheet data found.      Reviewed orders with patient.  Reviewed health maintenance and updated orders accordingly -   BP Readings from Last 3 Encounters:   03/12/21 129/54   11/16/20 128/68   01/21/20 128/68    Wt Readings from Last 3 Encounters:   03/12/21 (!) 150 kg (330 lb 12.8 oz)   11/16/20 145.6 kg (321 lb)   01/21/20 (!) 152 kg (335 lb)                        History of abnormal Pap smear: YES - other categories - see link Cervical Cytology Screening Guidelines  PAP / HPV 1/21/2020   PAP LSIL(A)     Reviewed and updated as needed this visit by clinical staff   Allergies               Reviewed and updated as needed this visit by Provider                    Review of Systems  CONSTITUTIONAL: NEGATIVE for fever, chills, change in weight  INTEGUMENTARU/SKIN: NEGATIVE for worrisome rashes, moles or lesions  EYES: NEGATIVE for vision changes or irritation  ENT: NEGATIVE for ear, mouth and throat problems  RESP: NEGATIVE for significant cough or SOB  BREAST: NEGATIVE for masses, tenderness or discharge  CV: NEGATIVE for chest pain, palpitations or peripheral edema  GI: NEGATIVE for nausea, abdominal pain, heartburn, or change in bowel habits  : NEGATIVE for unusual urinary or vaginal symptoms. Periods are regular.  MUSCULOSKELETAL: NEGATIVE for significant arthralgias or myalgia  NEURO: NEGATIVE for weakness, dizziness or paresthesias  PSYCHIATRIC: NEGATIVE for changes in mood or affect     OBJECTIVE:   There were no vitals taken for this visit.  Physical Exam  GENERAL: healthy, alert and no distress  EYES: Eyes grossly normal to inspection, PERRL and conjunctivae and sclerae normal  HENT: ear canals and TM's normal, nose and mouth without ulcers or lesions  NECK: no adenopathy, no  asymmetry, masses, or scars and thyroid normal to palpation  RESP: lungs clear to auscultation - no rales, rhonchi or wheezes  BREAST: normal without masses, tenderness or nipple discharge and no palpable axillary masses or adenopathy  CV: regular rate and rhythm, normal S1 S2, no S3 or S4, no murmur, click or rub, no peripheral edema and peripheral pulses strong  ABDOMEN: soft, nontender, no hepatosplenomegaly, no masses and bowel sounds normal   (female): normal female external genitalia, normal urethral meatus, vaginal mucosa pink, moist, well rugated, and normal cervix/adnexa/uterus without masses or discharge  MS: no gross musculoskeletal defects noted, no edema  SKIN: no suspicious lesions or rashes  NEURO: Normal strength and tone, mentation intact and speech normal  PSYCH: mentation appears normal, affect normal/bright    Diagnostic Test Results:  Labs reviewed in Epic    ASSESSMENT/PLAN:   1. Routine general medical examination at a health care facility      2. Screening for HIV (human immunodeficiency virus)    - HIV Antigen Antibody Combo    3. Screening for STDs (sexually transmitted diseases)    - NEISSERIA GONORRHOEA PCR  - CHLAMYDIA TRACHOMATIS PCR    4. Need for hepatitis C screening test    - Hepatitis C Screen Reflex to HCV RNA Quant and Genotype    5. Irregular menstrual cycle    - CBC with platelets and differential  - HCG qualitative    6. Screening for malignant neoplasm of cervix  Hx of cervical atypia followed annually per her age   - Pap imaged thin layer screen with HPV - recommended age 30 - 65 years (select HPV order below)    Patient has been advised of split billing requirements and indicates understanding:   COUNSELING:  Reviewed preventive health counseling, as reflected in patient instructions       Regular exercise       Healthy diet/nutrition       Vision screening    Estimated body mass index is 52.21 kg/m  as calculated from the following:    Height as of 1/21/20: 1.67 m (5'  "5.75\").    Weight as of 11/16/20: 145.6 kg (321 lb).    Weight management plan: Discussed healthy diet and exercise guidelines    She reports that she has been smoking. She has never used smokeless tobacco.  Tobacco Cessation Action Plan:   Not currently interested      Counseling Resources:  ATP IV Guidelines  Pooled Cohorts Equation Calculator  Breast Cancer Risk Calculator  BRCA-Related Cancer Risk Assessment: FHS-7 Tool  FRAX Risk Assessment  ICSI Preventive Guidelines  Dietary Guidelines for Americans, 2010  USDA's MyPlate  ASA Prophylaxis  Lung CA Screening    Ruy Sethi MD  [facility]"

## 2021-03-13 LAB
C TRACH DNA SPEC QL NAA+PROBE: NEGATIVE
N GONORRHOEA DNA SPEC QL NAA+PROBE: NEGATIVE
SPECIMEN SOURCE: NORMAL
SPECIMEN SOURCE: NORMAL

## 2021-03-13 ASSESSMENT — ASTHMA QUESTIONNAIRES: ACT_TOTALSCORE: 22

## 2021-03-16 LAB
COPATH REPORT: NORMAL
PAP: NORMAL

## 2021-03-19 LAB
FINAL DIAGNOSIS: ABNORMAL
HPV HR 12 DNA CVX QL NAA+PROBE: POSITIVE
HPV16 DNA SPEC QL NAA+PROBE: NEGATIVE
HPV18 DNA SPEC QL NAA+PROBE: NEGATIVE
SPECIMEN DESCRIPTION: ABNORMAL
SPECIMEN SOURCE CVX/VAG CYTO: ABNORMAL

## 2021-03-22 ENCOUNTER — PATIENT OUTREACH (OUTPATIENT)
Dept: FAMILY MEDICINE | Facility: CLINIC | Age: 21
End: 2021-03-22

## 2021-04-04 ENCOUNTER — NURSE TRIAGE (OUTPATIENT)
Dept: NURSING | Facility: CLINIC | Age: 21
End: 2021-04-04

## 2021-04-04 NOTE — TELEPHONE ENCOUNTER
Patient reports that she has been nauseated for 3 days. Patient has had dry heaves but has not vomited. Patient denies fever. Has had a decreased appetite, is drinking fluids. Smells are bothering patient, especially food smells. No new medications, patient uses an inhaler as needed. Patient denies any abdominal pain, did have an episode of mild cramping. Patient took a pregnancy test yesterday, was negative, but she thinks that she saw a faint line. Last period was in February, patient had 1-2 days of spotting in March.    Patient is advised on home care measures, along with rechecking pregnancy test, at this time and to follow up in clinic if symptoms continue. Patient verbalizes understanding and denies further questions at this time.    Saima Hernandez RN  [facility] Nurse Advisors        Additional Information    Negative: Shock suspected (e.g., cold/pale/clammy skin, too weak to stand, low BP, rapid pulse)    Negative: Sounds like a life-threatening emergency to the triager    Negative: [1] Nausea or vomiting AND [2] pregnancy < 20 weeks    Negative: Menstrual Period - Missed or Late (i.e., pregnancy suspected)    Negative: Heat exhaustion suspected (i.e., dehydration from heat exposure)    Negative: Motion sickness suspected (i.e., nausea with car, plane, boat, or train travel)    Negative: Anxiety or stress suspected (i.e., nausea with anxiety attacks or stressful situations)    Negative: Traumatic Brain Injury (TBI) suspected    Negative: Nausea (or Vomiting) in a cancer patient who is currently (or recently) receiving chemotherapy or radiation therapy, or cancer patient who has metastatic or end-stage cancer and is receiving palliative care    Negative: Vomiting occurs    Negative: Other symptom is present, see that guideline.  (e.g., chest pain, headache, dizziness, abdominal pain, colds, sore throat, etc.).    Negative: Unable to walk, or can only walk with assistance (e.g., requires support)     Negative: Difficulty breathing    Negative: [1] Insulin-dependent diabetes (Type I) AND [2] glucose > 400 mg/dl (22 mmol/l)    Negative: [1] Drinking very little AND [2] dehydration suspected (e.g., no urine > 12 hours, very dry mouth, very lightheaded)    Negative: Patient sounds very sick or weak to the triager    Negative: Fever > 104 F (40 C)    Negative: [1] Fever > 101 F (38.3 C) AND [2] age > 60    Negative: [1] Fever > 100.0 F (37.8 C) AND [2] bedridden (e.g., nursing home patient, CVA, chronic illness, recovering from surgery)    Negative: [1] Fever > 100.0 F (37.8 C) AND [2] diabetes mellitus or weak immune system (e.g., HIV positive, cancer chemo, splenectomy, organ transplant, chronic steroids)    Negative: Taking any of the following medications: digoxin (Lanoxin), lithium, theophylline, phenytoin (Dilantin)    Negative: Yellowish color of the skin or white of the eye (i.e., jaundice)    Negative: Fever present > 3 days (72 hours)    Unexplained nausea    Negative: Receiving cancer chemotherapy medication    Negative: Taking prescription medication that could cause nausea (e.g., narcotics/opiates, antibiotics, OCPs, many others)    Negative: Nausea lasts > 1 week    Negative: Alcohol or drug abuse, known or suspected    Negative: Nausea is a chronic symptom (recurrent or ongoing AND present > 4 weeks)    Protocols used: NAUSEA-A-AH  COVID 19 Nurse Triage Plan/Patient Instructions    Please be aware that novel coronavirus (COVID-19) may be circulating in the community. If you develop symptoms such as fever, cough, or SOB or if you have concerns about the presence of another infection including coronavirus (COVID-19), please contact your health care provider or visit [email or web address].     Disposition/Instructions    Home care recommended. Follow home care protocol based instructions.    Thank you for taking steps to prevent the spread of this virus.  o Limit your contact with others.  o Wear a  simple mask to cover your cough.  o Wash your hands well and often.    Resources    [facility]: About COVID-19: [email or web address]    CDC: What to Do If You're Sick: www.cdc.gov/coronavirus/2019-ncov/about/steps-when-sick.html    CDC: Ending Home Isolation: www.cdc.gov/coronavirus/2019-ncov/hcp/disposition-in-home-patients.html     CDC: Caring for Someone: www.cdc.gov/coronavirus/2019-ncov/if-you-are-sick/care-for-someone.html     [facility]: Interim Guidance for Hospital Discharge to Home: www.[facility].[place]/diseases/coronavirus/hcp/hospdischarge.pdf    [facility] clinical trials (COVID-19 research studies): [email or web address].[email or web address]-clinical-trials     Below are the COVID-19 hotlines at the [place] [facility] ([facility]). Interpreters are available.   o For health questions: Call 184-379-9481 or 1-341.147.7736 (7 a.m. to 7 p.m.)  o For questions about schools and childcare: Call 299-940-3468 or 1-443.590.1400 (7 a.m. to 7 p.m.)

## 2021-04-05 ENCOUNTER — OFFICE VISIT (OUTPATIENT)
Dept: URGENT CARE | Facility: URGENT CARE | Age: 21
End: 2021-04-05
Payer: COMMERCIAL

## 2021-04-05 VITALS
SYSTOLIC BLOOD PRESSURE: 140 MMHG | RESPIRATION RATE: 20 BRPM | DIASTOLIC BLOOD PRESSURE: 80 MMHG | TEMPERATURE: 98 F | BODY MASS INDEX: 53.26 KG/M2 | WEIGHT: 293 LBS | OXYGEN SATURATION: 98 % | HEART RATE: 78 BPM

## 2021-04-05 DIAGNOSIS — R11.0 NAUSEA: Primary | ICD-10-CM

## 2021-04-05 LAB
B-HCG SERPL-ACNC: <1 IU/L (ref 0–5)
BASOPHILS # BLD AUTO: 0 10E9/L (ref 0–0.2)
BASOPHILS NFR BLD AUTO: 0.2 %
DIFFERENTIAL METHOD BLD: NORMAL
EOSINOPHIL # BLD AUTO: 0.2 10E9/L (ref 0–0.7)
EOSINOPHIL NFR BLD AUTO: 1.6 %
ERYTHROCYTE [DISTWIDTH] IN BLOOD BY AUTOMATED COUNT: 12.6 % (ref 10–15)
HCT VFR BLD AUTO: 44.3 % (ref 35–47)
HGB BLD-MCNC: 14.4 G/DL (ref 11.7–15.7)
LYMPHOCYTES # BLD AUTO: 3.5 10E9/L (ref 0.8–5.3)
LYMPHOCYTES NFR BLD AUTO: 35.3 %
MCH RBC QN AUTO: 28.4 PG (ref 26.5–33)
MCHC RBC AUTO-ENTMCNC: 32.5 G/DL (ref 31.5–36.5)
MCV RBC AUTO: 87 FL (ref 78–100)
MONOCYTES # BLD AUTO: 0.7 10E9/L (ref 0–1.3)
MONOCYTES NFR BLD AUTO: 7.3 %
NEUTROPHILS # BLD AUTO: 5.5 10E9/L (ref 1.6–8.3)
NEUTROPHILS NFR BLD AUTO: 55.6 %
PLATELET # BLD AUTO: 359 10E9/L (ref 150–450)
RBC # BLD AUTO: 5.07 10E12/L (ref 3.8–5.2)
WBC # BLD AUTO: 9.8 10E9/L (ref 4–11)

## 2021-04-05 PROCEDURE — 36415 COLL VENOUS BLD VENIPUNCTURE: CPT | Performed by: FAMILY MEDICINE

## 2021-04-05 PROCEDURE — 84702 CHORIONIC GONADOTROPIN TEST: CPT | Performed by: FAMILY MEDICINE

## 2021-04-05 PROCEDURE — 99214 OFFICE O/P EST MOD 30 MIN: CPT | Performed by: FAMILY MEDICINE

## 2021-04-05 PROCEDURE — 85025 COMPLETE CBC W/AUTO DIFF WBC: CPT | Performed by: FAMILY MEDICINE

## 2021-04-05 RX ORDER — ONDANSETRON 4 MG/1
4 TABLET, ORALLY DISINTEGRATING ORAL EVERY 8 HOURS PRN
Qty: 10 TABLET | Refills: 0 | Status: SHIPPED | OUTPATIENT
Start: 2021-04-05 | End: 2021-05-11

## 2021-04-05 NOTE — PROGRESS NOTES
Assessment & Plan     Nausea  Blood work within normal limits. A course of zofran provided to treat nausea. Should new symptoms develop she is recommended to return for re-evaluation. Symptoms not consistent with gastroenteritis  - CBC with platelets and differential  - HCG Quantitative Pregnancy, Blood ([id number])  - ondansetron (ZOFRAN-ODT) 4 MG ODT tab  Dispense: 10 tablet; Refill: 0       56}  UPT and UA performed at urgency room this morning (labs reviewed in Epic) were unremarkable.       Sal Ahuja MD   [facility]    Bipin Langley is a 20 year old female who presents to clinic today for the following health issues:  Chief Complaint   Patient presents with     Urgent Care     Fever     fever/nausea/body aches X5 days      HPI    Periods of feeling nauseous has occurred intermittently in the past. She will have periods of being triggered by certain smells causing her to dry heave (perfume, ranch, dog)    Has had no vomiting/diarrhea. She is able to drink fluids without difficulty. Eating is tolerable so long as a specific smell doesn't trigger her.     No known exposures to COVID. No previous infections to COVId and has not received any vaccines.     She denies any fevers. Absent of cough.     She does endorse hip discomfort and some back discomfort of her lower back bilaterally. Denies hx of kidney infections. SHe is having daily regular bowel movements.     Last STD screening  Patient was at [facility] ED and then opted to leave after a 2 hour wait. Urine test there was collected    Reports feeling more emotional over last few days and will cry easily -- denies any recent stressors or major life events.     No hx of GERD/reflux/ingestion. Hx of cholecystectomy    She has no children.     Patient lives with her mom and her boyfriend    LMP: 2 months ago , no birth control. Urine pregnancy test at home showed a faint line (3 days ago)     Patient Active Problem List    Diagnosis Date Noted      Asthma 01/21/2020     Priority: Medium     Morbid obesity (H) 01/21/2020     Priority: Medium     Papanicolaou smear of cervix with low grade squamous intraepithelial lesion (LGSIL) 01/21/2020     Priority: Medium     1/21/20 LSIL pap. Plan pap in 1 year.   2/15/21 Reminder mychart  3/12/21 NIL, +HR HPV, not 16/18. 20 yr old. Plan pap in 12 months  3/22/21 Left msg TCB and MyChart letter sent. Pt notified         Current Outpatient Medications   Medication     albuterol (PROAIR HFA/PROVENTIL HFA/VENTOLIN HFA) 108 (90 Base) MCG/ACT inhaler     ibuprofen (ADVIL/MOTRIN) 200 MG capsule     ondansetron (ZOFRAN-ODT) 4 MG ODT tab     No current facility-administered medications for this visit.          Objective    BP (!) 140/80   Pulse 78   Temp 98  F (36.7  C)   Resp 20   Wt 149.7 kg (330 lb)   LMP 03/04/2021   SpO2 98%   BMI 53.26 kg/m    Physical Exam   GEN: NAD  Pulm: non-labored  Abd: no guarding    Results for orders placed or performed in visit on 04/05/21   CBC with platelets and differential     Status: None   Result Value Ref Range    WBC 9.8 4.0 - 11.0 10e9/L    RBC Count 5.07 3.8 - 5.2 10e12/L    Hemoglobin 14.4 11.7 - 15.7 g/dL    Hematocrit 44.3 35.0 - 47.0 %    MCV 87 78 - 100 fl    MCH 28.4 26.5 - 33.0 pg    MCHC 32.5 31.5 - 36.5 g/dL    RDW 12.6 10.0 - 15.0 %    Platelet Count 359 150 - 450 10e9/L    % Neutrophils 55.6 %    % Lymphocytes 35.3 %    % Monocytes 7.3 %    % Eosinophils 1.6 %    % Basophils 0.2 %    Absolute Neutrophil 5.5 1.6 - 8.3 10e9/L    Absolute Lymphocytes 3.5 0.8 - 5.3 10e9/L    Absolute Monocytes 0.7 0.0 - 1.3 10e9/L    Absolute Eosinophils 0.2 0.0 - 0.7 10e9/L    Absolute Basophils 0.0 0.0 - 0.2 10e9/L    Diff Method Automated Method    HCG Quantitative Pregnancy, Blood (RLX410)     Status: None   Result Value Ref Range    HCG Quantitative Serum <1 0 - 5 IU/L                   The use of Dragon/Logopro dictation services may have been used to construct the content in this  note; any grammatical or spelling errors are non-intentional. Please contact the author of this note directly if you are in need of any clarification.

## 2021-04-05 NOTE — PATIENT INSTRUCTIONS
For nausea take zofran every 8 hours as needed      WE will call you later today when the blood tests come back, the results of your tests will also be available on [facility]      Return to be seen if your symptoms worsen

## 2021-05-11 ENCOUNTER — MYC REFILL (OUTPATIENT)
Dept: URGENT CARE | Facility: URGENT CARE | Age: 21
End: 2021-05-11

## 2021-05-11 DIAGNOSIS — R11.0 NAUSEA: ICD-10-CM

## 2021-05-14 RX ORDER — ONDANSETRON 4 MG/1
4 TABLET, ORALLY DISINTEGRATING ORAL EVERY 8 HOURS PRN
Qty: 10 TABLET | Refills: 0 | Status: SHIPPED | OUTPATIENT
Start: 2021-05-14 | End: 2023-12-26

## 2021-06-04 ENCOUNTER — OFFICE VISIT (OUTPATIENT)
Dept: OBGYN | Facility: CLINIC | Age: 21
End: 2021-06-04
Payer: COMMERCIAL

## 2021-06-04 VITALS — BODY MASS INDEX: 53.26 KG/M2 | WEIGHT: 293 LBS | SYSTOLIC BLOOD PRESSURE: 116 MMHG | DIASTOLIC BLOOD PRESSURE: 66 MMHG

## 2021-06-04 DIAGNOSIS — N91.1 SECONDARY AMENORRHEA: Primary | ICD-10-CM

## 2021-06-04 LAB
FSH SERPL-ACNC: 6 IU/L
HCG SERPL QL: NEGATIVE
PROLACTIN SERPL-MCNC: 5 UG/L (ref 3–27)
TSH SERPL DL<=0.005 MIU/L-ACNC: 0.9 MU/L (ref 0.4–4)

## 2021-06-04 PROCEDURE — 84443 ASSAY THYROID STIM HORMONE: CPT | Performed by: OBSTETRICS & GYNECOLOGY

## 2021-06-04 PROCEDURE — 84403 ASSAY OF TOTAL TESTOSTERONE: CPT | Mod: 90 | Performed by: OBSTETRICS & GYNECOLOGY

## 2021-06-04 PROCEDURE — 84146 ASSAY OF PROLACTIN: CPT | Performed by: OBSTETRICS & GYNECOLOGY

## 2021-06-04 PROCEDURE — 83001 ASSAY OF GONADOTROPIN (FSH): CPT | Performed by: OBSTETRICS & GYNECOLOGY

## 2021-06-04 PROCEDURE — 99000 SPECIMEN HANDLING OFFICE-LAB: CPT | Performed by: OBSTETRICS & GYNECOLOGY

## 2021-06-04 PROCEDURE — 36415 COLL VENOUS BLD VENIPUNCTURE: CPT | Performed by: OBSTETRICS & GYNECOLOGY

## 2021-06-04 PROCEDURE — 99203 OFFICE O/P NEW LOW 30 MIN: CPT | Performed by: OBSTETRICS & GYNECOLOGY

## 2021-06-04 PROCEDURE — 84703 CHORIONIC GONADOTROPIN ASSAY: CPT | Performed by: OBSTETRICS & GYNECOLOGY

## 2021-06-04 PROCEDURE — 82627 DEHYDROEPIANDROSTERONE: CPT | Performed by: OBSTETRICS & GYNECOLOGY

## 2021-06-04 NOTE — PROGRESS NOTES
"  SUBJECTIVE:                                                   Korin Jason is a 20 year old female who presents to clinic today for the following health issue(s):  Patient presents with:  Amenorrhea: c/o continuous nausea past several months along with changes in menses      HPI:  Patient endorses menarche at age 9.  States her cycles were never very regular.  She recalls being told she had an ovarian cyst in her early teens and was placed on OCPs.  A Nexplanon was removed in 2020 after 3 years in situ.  She said she was amenorrheic prior to its insertion.    After Nexplanon removal she experienced about 1 year of monthly menstrual cycles which have stopped since 2021 prompting today's evaluation.    She denies any change in her overall health status.  She has been evaluated for chronic nausea.  Endorses regular marijuana use which could be a contributing factor.    Work up for secondary amenorrhea and PCOS discussed/explained.  Does not endorse hirsutism concerns other than facial acne which \"comes and goes\"    Patient's last menstrual period was 2021 (approximate)..   Patient is sexually active, .  Using none for contraception.     Problem list and histories reviewed & adjusted, as indicated.  Additional history: as documented.    Patient Active Problem List   Diagnosis     Asthma     Morbid obesity (H)     Papanicolaou smear of cervix with low grade squamous intraepithelial lesion (LGSIL)     Past Surgical History:   Procedure Laterality Date     GALLBLADDER SURGERY       HC REMOVE TONSILS/ADENOIDS,<11 Y/O  2005     wisdom Bilateral     All 4 removed      Social History     Tobacco Use     Smoking status: Current Every Day Smoker     Smokeless tobacco: Never Used   Substance Use Topics     Alcohol use: Yes      Problem (# of Occurrences) Relation (Name,Age of Onset)    Asthma (1) Mother    Breast Cancer (1) Maternal Grandmother: 47 at first diagnosis, had mastectomy; returned 12 " years later with lymph involvement    Kidney Disease (1) Father    Lymphoma (1) Maternal Grandmother    Uterine Cancer (1) Cousin: mid-30's, in remission            albuterol (PROAIR HFA/PROVENTIL HFA/VENTOLIN HFA) 108 (90 Base) MCG/ACT inhaler, Inhale 2 puffs into the lungs as needed for shortness of breath / dyspnea or wheezing  ibuprofen (ADVIL/MOTRIN) 200 MG capsule, Take 200 mg by mouth every 4 hours as needed for fever  ondansetron (ZOFRAN-ODT) 4 MG ODT tab, Take 1 tablet (4 mg) by mouth every 8 hours as needed for nausea    No current facility-administered medications on file prior to visit.     Allergies   Allergen Reactions     Augmentin        ROS:  5 point ROS negative except as noted above in HPI, including Gen., Resp., CV, GI &  system review.    OBJECTIVE:     /66   Wt 149.7 kg (330 lb)   LMP 02/04/2021 (Approximate)   BMI 53.26 kg/m     BMI: Body mass index is 53.26 kg/m .  General: Alert and oriented, no distress.  Psychiatric: Mood and affect within normal limits.  Skin: Warm and dry, no lesions, rashes or discolorations.    Abdomen: Obese,Soft, nontender, no hepatosplenomegaly, no rebound or guarding, no masses, no hernias.   Vulva:  No external lesions, normal female hair distribution, no inguinal adenopathy.    Urethra:  Midline, non-tender, well supported, no discharge  Vagina:  Well-estrogenized, no abnormal discharge, no lesions  Cervix: no lesions, no discharge and nulliparous  Uterus:  difficult to assess due to body habitus  Ovaries:  No masses appreciated, non-tender, mobile  Rectal Exam: deferred  Musculoskeletal: extremities normal        ASSESSMENT/PLAN:                                                        ICD-10-CM    1. Secondary amenorrhea  N91.1 Testosterone, total     Prolactin     TSH with free T4 reflex     Follicle stimulating hormone     DHEA sulfate     HCG qualitative, Blood ([id number])     US Pelvic Complete with Transvaginal         Likely due to oligo  ovulation    Will check TSH, prolactin, FSH, HCG testosterone and DHEAS    Schedule pelvic U/S.    If labs normal would advise a progestin challenge and then discuss either OCPs for cycle regulation or expectant management.  If desires to aggressively pursue pregnancy at this time ovulation induction may be warranted    Jony Kc MD  [facility]

## 2021-06-07 LAB — DHEA-S SERPL-MCNC: 363 UG/DL (ref 35–430)

## 2021-06-08 DIAGNOSIS — N91.1 SECONDARY AMENORRHEA: Primary | ICD-10-CM

## 2021-06-08 LAB — TESTOST SERPL-MCNC: 56 NG/DL (ref 8–60)

## 2021-06-08 RX ORDER — MEDROXYPROGESTERONE ACETATE 10 MG
10 TABLET ORAL DAILY
Qty: 10 TABLET | Refills: 0 | Status: SHIPPED | OUTPATIENT
Start: 2021-06-08 | End: 2021-08-02

## 2021-06-17 ENCOUNTER — HOSPITAL ENCOUNTER (OUTPATIENT)
Dept: ULTRASOUND IMAGING | Facility: CLINIC | Age: 21
Discharge: HOME | End: 2021-06-17
Attending: OBSTETRICS & GYNECOLOGY | Admitting: OBSTETRICS & GYNECOLOGY
Payer: COMMERCIAL

## 2021-06-17 DIAGNOSIS — N91.1 SECONDARY AMENORRHEA: ICD-10-CM

## 2021-06-17 PROCEDURE — 76830 TRANSVAGINAL US NON-OB: CPT

## 2021-08-02 ENCOUNTER — OFFICE VISIT (OUTPATIENT)
Dept: URGENT CARE | Facility: URGENT CARE | Age: 21
End: 2021-08-02
Payer: COMMERCIAL

## 2021-08-02 VITALS
BODY MASS INDEX: 52.41 KG/M2 | TEMPERATURE: 97.7 F | DIASTOLIC BLOOD PRESSURE: 60 MMHG | WEIGHT: 293 LBS | HEART RATE: 74 BPM | OXYGEN SATURATION: 98 % | SYSTOLIC BLOOD PRESSURE: 110 MMHG

## 2021-08-02 DIAGNOSIS — H60.391 INFECTIVE OTITIS EXTERNA, RIGHT: Primary | ICD-10-CM

## 2021-08-02 PROCEDURE — 99213 OFFICE O/P EST LOW 20 MIN: CPT | Performed by: FAMILY MEDICINE

## 2021-08-02 RX ORDER — NEOMYCIN SULFATE, POLYMYXIN B SULFATE, HYDROCORTISONE 3.5; 10000; 1 MG/ML; [USP'U]/ML; MG/ML
3 SOLUTION/ DROPS AURICULAR (OTIC) 4 TIMES DAILY
Qty: 10 ML | Refills: 0 | Status: SHIPPED | OUTPATIENT
Start: 2021-08-02 | End: 2021-08-12

## 2021-08-02 RX ORDER — SULFAMETHOXAZOLE/TRIMETHOPRIM 800-160 MG
1 TABLET ORAL 2 TIMES DAILY
Qty: 20 TABLET | Refills: 0 | Status: SHIPPED | OUTPATIENT
Start: 2021-08-02 | End: 2021-08-12

## 2021-08-02 NOTE — PROGRESS NOTES
S: Very pleasant 20-year-old female presents with right-sided ear pain for 24 hours in duration of mild intensity.  ROS: Negative for drainage.  Negative for hearing loss.  Negative for fever.  SH: Positive for smoking.    Meds: Zofran, albuterol, ibuprofen    O: Blood pressure 110/60, temperature 97.7, pulse 74  NAD  HEENT-  --TMs clear but right external ear tender to palpation with slightly swollen external canal  --Sclera and conjunctiva non-injected  --Pharynx non-erythematous  --No rhinorrhea  Neck-  --Supple, no meningeal signs  --No cervical lymphadenopathy  Lungs--  --No adventitious sounds  Heart-  --Regular rate and rhythm  Skin-  --Pink and dry    A: Otitis externa right    P: Cortisporin otic  Bactrim DS twice daily 10 days  Strongly encourage smoking cessation  Return if not improving

## 2021-08-14 ENCOUNTER — HOSPITAL ENCOUNTER (EMERGENCY)
Facility: CLINIC | Age: 21
Discharge: HOME | End: 2021-08-14
Attending: EMERGENCY MEDICINE | Admitting: EMERGENCY MEDICINE
Payer: COMMERCIAL

## 2021-08-14 VITALS
TEMPERATURE: 97.1 F | OXYGEN SATURATION: 99 % | SYSTOLIC BLOOD PRESSURE: 144 MMHG | BODY MASS INDEX: 51.65 KG/M2 | RESPIRATION RATE: 16 BRPM | WEIGHT: 293 LBS | HEART RATE: 83 BPM | DIASTOLIC BLOOD PRESSURE: 93 MMHG

## 2021-08-14 DIAGNOSIS — H60.393 INFECTIVE OTITIS EXTERNA, BILATERAL: ICD-10-CM

## 2021-08-14 PROCEDURE — 250N000009 HC RX 250

## 2021-08-14 PROCEDURE — 99283 EMERGENCY DEPT VISIT LOW MDM: CPT

## 2021-08-14 RX ORDER — TETRACAINE HYDROCHLORIDE 5 MG/ML
2 SOLUTION OPHTHALMIC ONCE
Status: COMPLETED | OUTPATIENT
Start: 2021-08-14 | End: 2021-08-14

## 2021-08-14 RX ORDER — TETRACAINE HYDROCHLORIDE 5 MG/ML
SOLUTION OPHTHALMIC
Status: COMPLETED
Start: 2021-08-14 | End: 2021-08-14

## 2021-08-14 RX ADMIN — TETRACAINE HYDROCHLORIDE 1 DROP: 5 SOLUTION OPHTHALMIC at 09:00

## 2021-08-14 ASSESSMENT — ENCOUNTER SYMPTOMS
FEVER: 0
VOMITING: 0
COUGH: 0

## 2021-08-14 NOTE — ED PROVIDER NOTES
"  History   Chief Complaint:  Otalgia     HPI   Korin Jason is a 20 year old female who presents with bilateral otalgia. The patient was seen in clinic 08/02/21 for an right ear infection and was prescribed cortisporin otic and bactrim DS. FPC through her course of antibiotics, her left ear started to hurt but she thought it would improve with interventions like her right ear. Both ears did improve for a period of time but for the last 2-3 days, her pain has worsened. She reports increased pain with touch but states it is uncomfortable sitting at rest. She also reports intermittent, random 'waves of pure pain.\" She denies drainage, nausea, vomiting, cough, or other symptoms. 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 She has a history of an allergy to Augmentin with hives when she was young.    Review of Systems   Constitutional: Negative for fever.   HENT: Positive for ear pain. Negative for ear discharge.    Respiratory: Negative for cough.    Gastrointestinal: Negative for vomiting.   All other systems reviewed and are negative.      Allergies:  Augmentin    Medications:  albuterol  ondansetron     Past Medical History:    Asthma   Morbid obesity   Nexplanon insertion  Ovarian cyst     Past Surgical History:    Gallbladder surgery   Tonsillectomy & Adenoidectomy   Geraldine teeth extraction     Family History:    Mother: asthma , depression   Father: kidney disease     Social History:  [employer]  Tobacco use.     Physical Exam     Patient Vitals for the past 24 hrs:   BP Temp Temp src Pulse Resp SpO2 Weight   08/14/21 0822 (!) 144/93 97.1  F (36.2  C) Temporal 83 16 99 % 145.2 kg (320 lb)       Physical Exam      HEENT:    Tympanic membranes are clear bilateral.       EAC is erythematous with debris and pain with otoscopic exam bilateral     No mastoid tenderness  Eyes:    Conjunctiva normal, PERRL  Neck:    Supple, no meningismus.       No pain with manipulation of the hyoid.   CV:  "    Regular rate and rhythm     No murmurs, rubs or gallops.    PULM:    Clear to auscultation bilateral.       No respiratory distress.       No stridor, rales or wheezing.  MSK:     No gross deformity to all four extremities.   LYMPH:   No cervical lymphadenopathy.  NEURO:   Alert.  Normal muscular tone, no atrophy.  Skin:    Warm, dry and intact.    PSYCH:    Mood is good and affect is appropriate.    Emergency Department Course     Emergency Department Course:    Reviewed:  I reviewed nursing notes, vitals and past medical history    Assessments:  0845 I obtained history and examined the patient in ED01 as noted above.     Interventions:  0900 Tetracaine 0.5%, 1 drop per ear    Disposition:  The patient was discharged to home.     Impression & Plan     Medical Decision Makin-year-old female presents to the ED with bilateral ear pain after recent diagnosis of right otitis externa not improving on Corticosporin drops and Bactrim.  She has bilateral active otitis externa with no signs of otitis media.  We will transition from Corticosporin to Cipro/hydrocortisone otic drops.  There is no significant edema or profound debris to indicate curettage or wick placement.  Patient safe to discharge home and will follow up with PCP if symptoms persist or return to ED for any worsening symptoms.    Diagnosis:    ICD-10-CM    1. Infective otitis externa, bilateral  H60.393      Discharge Medications:  Discharge Medication List as of 2021  9:03 AM      START taking these medications    Details   ciprofloxacin-hydrocortisone (CIPRO HC OTIC) 0.2-1 % otic suspension Place 3 drops into both ears 2 times daily for 7 days, Disp-3 mL, R-0, Local Print           Scribe Disclosure:  MIROSLAVA, Joanne James, am serving as a scribe at 9:40 AM on 2021 to document services personally performed by Ravindra De La Rosa MD based on my observations and the provider's statements to me.     Ravindra De La Rosa MD  21  [phone]

## 2021-08-14 NOTE — ED TRIAGE NOTES
"Patient reports recent right ear infection 2 weeks ago, seen at urgent care and started antibiotics, it got better, now has recurred and left ear is painful as well, \"both the inside and it hurts to touch it\".   "

## 2021-10-10 ENCOUNTER — HEALTH MAINTENANCE LETTER (OUTPATIENT)
Age: 21
End: 2021-10-10

## 2022-02-24 ENCOUNTER — PATIENT OUTREACH (OUTPATIENT)
Dept: OBGYN | Facility: CLINIC | Age: 22
End: 2022-02-24
Payer: COMMERCIAL

## 2022-02-24 DIAGNOSIS — R87.612 PAPANICOLAOU SMEAR OF CERVIX WITH LOW GRADE SQUAMOUS INTRAEPITHELIAL LESION (LGSIL): ICD-10-CM

## 2022-04-22 NOTE — TELEPHONE ENCOUNTER
Dr. Sethi,   Patient is lost to pap tracking follow-up. Attempts to contact pt have been made per reminder process and there has been no reply and/or no appt scheduled.       Pap Hx:  1/21/20 LSIL pap. Plan pap in 1 year.   3/12/21 NIL, +HR HPV, not 16/18. 20 yr old. Plan pap in 12 months  3/22/21 Pt notified  2/24/2022 Reminder MyChart  3/24/2022 Reminder call - lm  04/22/22 Lost to follow-up for pap tracking, marilynn routed to provider

## 2022-05-21 ENCOUNTER — HEALTH MAINTENANCE LETTER (OUTPATIENT)
Age: 22
End: 2022-05-21

## 2022-05-23 ENCOUNTER — NURSE TRIAGE (OUTPATIENT)
Dept: NURSING | Facility: CLINIC | Age: 22
End: 2022-05-23
Payer: COMMERCIAL

## 2022-05-23 NOTE — TELEPHONE ENCOUNTER
Triage call:     Patient calling with vaginal bleeding.   She reports she had a period two weeks ago. History of regular periods.   Yesterday, she started cramping and bleeding heavily.   She is changing a soaked tampon every 30-40min and notes large blood clots.   She also complains of severe lower abdominal cramping.   She reports a constant sharp pain that she rates 8/10. She works at a  and has been sitting most of the day because the pain makes it difficult to walk.     Per protocol, patient was advised to go to ED now. She verbalizes understanding and agreement with this plan.     Dalia Rivera RN   05/23/22 12:48 PM  [facility] Nurse Advisor    Reason for Disposition    SEVERE abdominal pain (e.g., excruciating)    Additional Information    Negative: SEVERE vaginal bleeding (e.g., continuous red blood from vagina, or large blood clots) and very weak (can't stand)    Negative: Passed out (i.e., fainted, collapsed and was not responding)    Negative: Shock suspected (e.g., cold/pale/clammy skin, too weak to stand, low BP, rapid pulse)    Negative: Difficult to awaken or acting confused (e.g., disoriented, slurred speech)    Negative: Sounds like a life-threatening emergency to the triager    Negative: Pregnant > 20 weeks (5 months or more)    Negative: Pregnant < 20 weeks (less than 5 months)    Negative: Postpartum (from 0 to 6 weeks after delivery)    Negative: Vaginal discharge is the main symptom and bleeding is slight    Protocols used: VAGINAL BLEEDING - [id number]

## 2022-09-18 ENCOUNTER — HEALTH MAINTENANCE LETTER (OUTPATIENT)
Age: 22
End: 2022-09-18

## 2023-01-21 ENCOUNTER — OFFICE VISIT (OUTPATIENT)
Dept: URGENT CARE | Facility: URGENT CARE | Age: 23
End: 2023-01-21
Payer: COMMERCIAL

## 2023-01-21 VITALS
OXYGEN SATURATION: 98 % | BODY MASS INDEX: 49.91 KG/M2 | TEMPERATURE: 97.9 F | DIASTOLIC BLOOD PRESSURE: 88 MMHG | SYSTOLIC BLOOD PRESSURE: 119 MMHG | HEART RATE: 121 BPM | WEIGHT: 293 LBS

## 2023-01-21 DIAGNOSIS — H92.01 OTALGIA, RIGHT: ICD-10-CM

## 2023-01-21 DIAGNOSIS — J02.0 STREP PHARYNGITIS: Primary | ICD-10-CM

## 2023-01-21 DIAGNOSIS — J45.20 MILD INTERMITTENT ASTHMA WITHOUT COMPLICATION: ICD-10-CM

## 2023-01-21 LAB — DEPRECATED S PYO AG THROAT QL EIA: POSITIVE

## 2023-01-21 PROCEDURE — 87880 STREP A ASSAY W/OPTIC: CPT | Performed by: EMERGENCY MEDICINE

## 2023-01-21 PROCEDURE — 99214 OFFICE O/P EST MOD 30 MIN: CPT | Performed by: EMERGENCY MEDICINE

## 2023-01-21 RX ORDER — FLUTICASONE PROPIONATE 50 MCG
1 SPRAY, SUSPENSION (ML) NASAL DAILY
Qty: 9.9 ML | Refills: 0 | Status: SHIPPED | OUTPATIENT
Start: 2023-01-21 | End: 2023-12-20

## 2023-01-21 RX ORDER — ALBUTEROL SULFATE 90 UG/1
2 AEROSOL, METERED RESPIRATORY (INHALATION) EVERY 6 HOURS PRN
Qty: 18 G | Refills: 0 | Status: SHIPPED | OUTPATIENT
Start: 2023-01-21 | End: 2023-12-20

## 2023-01-21 RX ORDER — AZITHROMYCIN 250 MG/1
TABLET, FILM COATED ORAL
Qty: 6 TABLET | Refills: 0 | Status: SHIPPED | OUTPATIENT
Start: 2023-01-21 | End: 2023-01-26

## 2023-01-21 NOTE — PROGRESS NOTES
Assessment & Plan     Diagnosis:  (J02.0) Strep pharyngitis  (primary encounter diagnosis)  Plan: Azithromycin (ZITHROMAX) 250 MG        tablet    (H92.01) Otalgia, right  Plan: fluticasone (FLONASE) 50 MCG/ACT nasal spray    (J45.20) Mild intermittent asthma without complication  Plan: albuterol (PROAIR HFA/PROVENTIL HFA/VENTOLIN         HFA) 108 (90 Base) MCG/ACT inhaler      Medical Decision Making:  Korin Jason is a 22 year old female who presents with sore throat and clinical evidence of pharyngitis.  The rapid strep test is positive. I see no clinical evidence of  peritonsillar abscess, retropharyngeal abscess, Lemierre's Syndrome, epiglottis, or Marcellus's angina. The patient's symptoms are consistent with streptococcal pharyngitis.  Uvula midline.  Non-toxic appearing.  No drooling.  No stridor. I have recommended treatment with antibiotics and analgesics.  Patient also notes she has hx of asthma and while she has not been wheezing or feeling really short of breath, she is concerned that she is out of her albuterol inhaler.  This was refilled.  She has no hypoxia, wheezing or respiratory concerns on exam or by history. Go to the ER if increasing pain, change in voice, neck pain, vomiting, fever, or shortness of breath/wheezing. Follow-up with primary physician if not improving in 3 days. Patient verbalized understanding and agreement with the plan.    AR[facility] Sosa [facility] URGENT CARE    Subjective     Korin Jason is a 22 year old female who presents to clinic today for the following health issues:  Chief Complaint   Patient presents with     Urgent Care     Pharyngitis     Since Thursday evening      Ear Problem     Feel like air bobble in the ear (right)       HPI  Onset of symptoms was 2 day(s) ago.  Course of illness is worsening.    Severity moderate  Current and Associated symptoms: fever, runny nose, stuffy nose, ear pain right, sore throat and facial pain/pressure  Denies  shortness of breath, nausea, vomiting, diarrhea, not eating, taking in fluids?  Yes -- hurts to swallow food and water; but able to swallow OK right now.   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 She denies any vomiting or difficulties swallowing food/fluids at this time.       Review of Systems    See HPI    Objective      Vitals: /88 (BP Location: Left arm, Patient Position: Sitting, Cuff Size: Adult Large)   Pulse (!) 121   Temp 97.9  F (36.6  C) (Tympanic)   Wt 140.3 kg (309 lb 3.2 oz)   SpO2 98%   BMI 49.91 kg/m    Resp: 16    Patient Vitals for the past 24 hrs:   BP Temp Temp src Pulse SpO2 Weight   01/21/23 1101 119/88 97.9  F (36.6  C) Tympanic (!) 121 98 % 140.3 kg (309 lb 3.2 oz)       Vital signs reviewed by: Sharath Coker PA-C    Physical Exam   Constitutional: Alert and active. Non-toxic appearing.  Mild acute distress.  HENT:   Right Ear: External ear normal. TM: gray/pale appearing  Left Ear: External ear normal. TM: gray/pale appearing.  Nose: Nose normal.    Eyes: Conjunctivae, EOM and lids are normal.   Mouth: Mucous membranes are moist. Posterior oropharynx is erythematous. Exudates not present. Tonsils are symmetrically enlarged. Uvula is midline. No submandibular swelling or erythema.  Neck: Normal ROM. No meningismus. Anterior cervical lymphadenopathy noted on the right. No posterior chain cervical lymphadenopathy noted.  Cardiovascular: Regular rate and rhythm  Pulmonary/Chest: Effort normal. No respiratory distress. Lungs are clear to auscultation throughout. No wheezes, rales or rhonchi.   GI: Abdomen is soft and non-tender throughout.  Musculoskeletal: Normal range of motion.   Neurological: Alert and appropriately oriented for age.   Skin: No rash noted on visualized skin.        Labs/Imaging:  Results for orders placed or performed in visit on 01/21/23   Streptococcus A Rapid Screen w/Reflex to PCR - Clinic Collect     Status: Abnormal    Specimen: Throat; Swab   Result Value Ref Range    Group A Strep antigen Positive (A) Negative         Sharath Coker PA-C, January 21, 2023

## 2023-01-27 ENCOUNTER — OFFICE VISIT (OUTPATIENT)
Dept: FAMILY MEDICINE | Facility: CLINIC | Age: 23
End: 2023-01-27
Payer: COMMERCIAL

## 2023-01-27 VITALS
DIASTOLIC BLOOD PRESSURE: 83 MMHG | HEIGHT: 66 IN | BODY MASS INDEX: 47.09 KG/M2 | SYSTOLIC BLOOD PRESSURE: 138 MMHG | RESPIRATION RATE: 18 BRPM | OXYGEN SATURATION: 99 % | WEIGHT: 293 LBS | HEART RATE: 94 BPM | TEMPERATURE: 98.5 F

## 2023-01-27 DIAGNOSIS — Z23 NEED FOR COVID-19 VACCINE: ICD-10-CM

## 2023-01-27 DIAGNOSIS — Z23 NEED FOR PNEUMOCOCCAL VACCINE: ICD-10-CM

## 2023-01-27 DIAGNOSIS — J45.40 MODERATE PERSISTENT ASTHMA WITHOUT COMPLICATION: ICD-10-CM

## 2023-01-27 DIAGNOSIS — H69.91 DYSFUNCTION OF RIGHT EUSTACHIAN TUBE: ICD-10-CM

## 2023-01-27 DIAGNOSIS — Z00.00 ENCOUNTER FOR ANNUAL PHYSICAL EXAM: Primary | ICD-10-CM

## 2023-01-27 DIAGNOSIS — L30.9 ECZEMA, UNSPECIFIED TYPE: ICD-10-CM

## 2023-01-27 DIAGNOSIS — Z23 INFLUENZA VACCINE NEEDED: ICD-10-CM

## 2023-01-27 PROCEDURE — 90686 IIV4 VACC NO PRSV 0.5 ML IM: CPT | Performed by: FAMILY MEDICINE

## 2023-01-27 PROCEDURE — 90471 IMMUNIZATION ADMIN: CPT | Performed by: FAMILY MEDICINE

## 2023-01-27 PROCEDURE — 90677 PCV20 VACCINE IM: CPT | Performed by: FAMILY MEDICINE

## 2023-01-27 PROCEDURE — 91312 COVID-19 VACCINE BIVALENT BOOSTER 12+ (PFIZER): CPT | Performed by: FAMILY MEDICINE

## 2023-01-27 PROCEDURE — 0124A COVID-19 VACCINE BIVALENT BOOSTER 12+ (PFIZER): CPT | Performed by: FAMILY MEDICINE

## 2023-01-27 PROCEDURE — 90472 IMMUNIZATION ADMIN EACH ADD: CPT | Performed by: FAMILY MEDICINE

## 2023-01-27 PROCEDURE — 99395 PREV VISIT EST AGE 18-39: CPT | Mod: 25 | Performed by: FAMILY MEDICINE

## 2023-01-27 PROCEDURE — 99214 OFFICE O/P EST MOD 30 MIN: CPT | Mod: 25 | Performed by: FAMILY MEDICINE

## 2023-01-27 RX ORDER — FLUTICASONE PROPIONATE AND SALMETEROL XINAFOATE 115; 21 UG/1; UG/1
1 AEROSOL, METERED RESPIRATORY (INHALATION) 2 TIMES DAILY
Qty: 12 G | Refills: 11 | Status: SHIPPED | OUTPATIENT
Start: 2023-01-27 | End: 2024-02-18

## 2023-01-27 RX ORDER — TRIAMCINOLONE ACETONIDE 1 MG/G
OINTMENT TOPICAL 2 TIMES DAILY
Qty: 30 G | Refills: 3 | Status: SHIPPED | OUTPATIENT
Start: 2023-01-27

## 2023-01-27 ASSESSMENT — ASTHMA QUESTIONNAIRES
QUESTION_1 LAST FOUR WEEKS HOW MUCH OF THE TIME DID YOUR ASTHMA KEEP YOU FROM GETTING AS MUCH DONE AT WORK, SCHOOL OR AT HOME: SOME OF THE TIME
QUESTION_5 LAST FOUR WEEKS HOW WOULD YOU RATE YOUR ASTHMA CONTROL: POORLY CONTROLLED
QUESTION_4 LAST FOUR WEEKS HOW OFTEN HAVE YOU USED YOUR RESCUE INHALER OR NEBULIZER MEDICATION (SUCH AS ALBUTEROL): NOT AT ALL
ACT_TOTALSCORE: 13
QUESTION_2 LAST FOUR WEEKS HOW OFTEN HAVE YOU HAD SHORTNESS OF BREATH: MORE THAN ONCE A DAY
QUESTION_3 LAST FOUR WEEKS HOW OFTEN DID YOUR ASTHMA SYMPTOMS (WHEEZING, COUGHING, SHORTNESS OF BREATH, CHEST TIGHTNESS OR PAIN) WAKE YOU UP AT NIGHT OR EARLIER THAN USUAL IN THE MORNING: TWO OR THREE NIGHTS A WEEK
ACT_TOTALSCORE: 13

## 2023-01-27 ASSESSMENT — ENCOUNTER SYMPTOMS
WHEEZING: 1
FATIGUE: 0
COUGH: 1
PALPITATIONS: 0
FEVER: 0

## 2023-01-27 NOTE — PROGRESS NOTES
SUBJECTIVE:   CC: Korin is an 22 year old who presents for preventive health visit.   Patient has been advised of split billing requirements and indicates understanding: Yes  Healthy Habits:     Taking medications regularly:  0    PHQ-2 Total Score: 1  History of Present Illness     Asthma:  She presents for follow up of asthma.  She has some cough, some wheezing, and some shortness of breath. She is using a relief medication 2-3 times per day. She does not miss any doses of her controller medication throughout the week.Patient is aware of the following triggers: cold air, exercise or sports and humidity. The patient has had a visit to the Emergency Room, Urgent Care or Hospital due to asthma since the last clinic visit. She has been to the Emergency Room or Urgent Care 1 time.She has had a Hospitalization 3 or more times.    She eats 2-3 servings of fruits and vegetables daily.She consumes 2 sweetened beverage(s) daily.She exercises with enough effort to increase her heart rate 60 or more minutes per day.  She exercises with enough effort to increase her heart rate 5 days per week.   She is taking medications regularly.              Today's PHQ-2 Score:   PHQ-2 ( 1999 Pfizer) 1/27/2023   Q1: Little interest or pleasure in doing things 0   Q2: Feeling down, depressed or hopeless 1   PHQ-2 Score 1   PHQ-2 Total Score (12-17 Years)- Positive if 3 or more points; Administer PHQ-A if positive -   Q1: Little interest or pleasure in doing things Not at all   Q2: Feeling down, depressed or hopeless Several days   PHQ-2 Score 1           Social History     Tobacco Use     Smoking status: Every Day     Packs/day: 0.50     Types: Cigarettes     Smokeless tobacco: Never   Substance Use Topics     Alcohol use: Yes           Reviewed orders with patient.  Reviewed health maintenance and updated orders accordingly - Yes            History of abnormal Pap smear: YES - she will undergo PAP next month  PAP / HPV Latest Ref Rng &  "Units 3/12/2021 1/21/2020   PAP (Historical) - NIL LSIL(A)   HPV16 NEG:Negative Negative -   HPV18 NEG:Negative Negative -   HRHPV NEG:Negative Positive(A) -     Reviewed and updated as needed this visit by clinical staff   Tobacco  Allergies  Meds  Problems             Reviewed and updated as needed this visit by Provider      Problems            Past Medical History:   Diagnosis Date     Abnormal Pap smear of cervix 01/21/2020    See problem list     Asthma      Cervical high risk HPV (human papillomavirus) test positive 03/12/2021     Morbid obesity (H)      Tobacco use       Past Surgical History:   Procedure Laterality Date     GALLBLADDER SURGERY  2001     HC REMOVE TONSILS/ADENOIDS,<13 Y/O  2005     wisdom Bilateral 2015    All 4 removed       Review of Systems   Constitutional: Negative for fatigue and fever.   HENT: Positive for ear pain (right) and hearing loss. Negative for congestion.    Respiratory: Positive for cough and wheezing.    Cardiovascular: Negative for chest pain and palpitations.          OBJECTIVE:   /83 (BP Location: Right arm, Patient Position: Chair, Cuff Size: Adult Large)   Pulse 94   Temp 98.5  F (36.9  C) (Oral)   Resp 18   Ht 1.676 m (5' 6\")   Wt 140.2 kg (309 lb)   LMP 01/24/2023 (Exact Date)   SpO2 99%   BMI 49.87 kg/m    Physical Exam  Constitutional:       General: She is not in acute distress.     Appearance: She is not ill-appearing.   HENT:      Right Ear: Tympanic membrane normal. Right ear decreased TM mobility: no mobility with valsalva.      Left Ear: Tympanic membrane normal.   Pulmonary:      Effort: Pulmonary effort is normal. No respiratory distress.   Skin:     Comments: Bilateral erythema, scaling of hands   Neurological:      Mental Status: She is alert.               ASSESSMENT/PLAN:       ICD-10-CM    1. Encounter for annual physical exam  Z00.00       2. Eczema, unspecified type  L30.9 triamcinolone (KENALOG) 0.1 % external ointment      3. " Moderate persistent asthma without complication  J45.40 fluticasone-salmeterol (ADVAIR HFA) 115-21 MCG/ACT inhaler      4. Dysfunction of right eustachian tube  H69.81       5. Need for COVID-19 vaccine  Z23 COVID-19,PF,PFIZER BOOSTER BIVALENT (12+YRS)      6. Influenza vaccine needed  Z23 INFLUENZA VACCINE IM > 6 MONTHS VALENT IIV4 (AFLURIA/FLUZONE)      7. Need for pneumococcal vaccine  Z23 Pneumococcal 20 Valent Conjugate (Prevnar 20)            Adding advair -- return to clinic in 1 month to assess asthma symptoms. Given asthma action plan.    Flonase/afrin for eustachian tube dysfunction    Steroid and emollient for hand eczema    COUNSELING:  Reviewed preventive health counseling, as reflected in patient instructions        She reports that she has been smoking cigarettes. She has been smoking an average of .5 packs per day. She has never used smokeless tobacco.        CHARMAINE SOOD MD  [facility]

## 2023-03-17 ENCOUNTER — OFFICE VISIT (OUTPATIENT)
Dept: MIDWIFE SERVICES | Facility: CLINIC | Age: 23
End: 2023-03-17
Payer: COMMERCIAL

## 2023-03-17 VITALS
DIASTOLIC BLOOD PRESSURE: 70 MMHG | WEIGHT: 293 LBS | BODY MASS INDEX: 51.81 KG/M2 | OXYGEN SATURATION: 96 % | HEART RATE: 105 BPM | SYSTOLIC BLOOD PRESSURE: 122 MMHG

## 2023-03-17 DIAGNOSIS — Z11.3 SCREEN FOR STD (SEXUALLY TRANSMITTED DISEASE): ICD-10-CM

## 2023-03-17 DIAGNOSIS — R87.612 LGSIL ON PAP SMEAR OF CERVIX: Primary | ICD-10-CM

## 2023-03-17 DIAGNOSIS — B97.7 HPV (HUMAN PAPILLOMA VIRUS) INFECTION: ICD-10-CM

## 2023-03-17 PROCEDURE — 87491 CHLMYD TRACH DNA AMP PROBE: CPT | Performed by: ADVANCED PRACTICE MIDWIFE

## 2023-03-17 PROCEDURE — 99213 OFFICE O/P EST LOW 20 MIN: CPT | Performed by: ADVANCED PRACTICE MIDWIFE

## 2023-03-17 PROCEDURE — 88175 CYTOPATH C/V AUTO FLUID REDO: CPT | Performed by: ADVANCED PRACTICE MIDWIFE

## 2023-03-17 PROCEDURE — 87591 N.GONORRHOEAE DNA AMP PROB: CPT | Performed by: ADVANCED PRACTICE MIDWIFE

## 2023-03-17 NOTE — PROGRESS NOTES
S:  Korin Jason is a 22 year old  who presents today for follow up pap smear. She had a pap smear in 2020 that showed LSIL. Her follow up pap smear a year later was NIL with +HR HPV. She was advised to get a pap smear in a year. Pap smear collected today. She would also like STI testing. She has no other questions or concerns today. She recently had her annual preventative exam with her PCP.   She is currently working as a . She is also working on getting pre-request classes done for nursing school. 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     O:  /70   Pulse 105   Wt 145.6 kg (321 lb)   LMP 2023 (Exact Date)   SpO2 96%   BMI 51.81 kg/m     Patient is well   Pelvic exam: normal vagina and vulva, vaginal discharge described as white, normal cervix without lesions or tenderness, pap smear done.     A:  Pap smear     P:  (R87.612) LGSIL on Pap smear of cervix  (primary encounter diagnosis)  Plan: Pap diagnostic only    (B97.7) HPV (human papilloma virus) infection  Plan: Pap diagnostic only    (Z11.3) Screen for STD (sexually transmitted disease)  Plan: NEISSERIA GONORRHOEA PCR, CHLAMYDIA TRACHOMATIS        PCR    Follow up PRN.   NICOLE Butler CNM

## 2023-03-18 LAB
C TRACH DNA SPEC QL NAA+PROBE: NEGATIVE
N GONORRHOEA DNA SPEC QL NAA+PROBE: NEGATIVE

## 2023-03-22 ENCOUNTER — MYC MEDICAL ADVICE (OUTPATIENT)
Dept: MIDWIFE SERVICES | Facility: CLINIC | Age: 23
End: 2023-03-22
Payer: COMMERCIAL

## 2023-03-22 ENCOUNTER — NURSE TRIAGE (OUTPATIENT)
Dept: NURSING | Facility: CLINIC | Age: 23
End: 2023-03-22
Payer: COMMERCIAL

## 2023-03-22 LAB
BKR LAB AP GYN ADEQUACY: NORMAL
BKR LAB AP GYN INTERPRETATION: NORMAL
BKR LAB AP HPV REFLEX: NO
BKR LAB AP PREVIOUS ABNL DX: NORMAL
BKR LAB AP PREVIOUS ABNORMAL: NORMAL
PATH REPORT.COMMENTS IMP SPEC: NORMAL
PATH REPORT.COMMENTS IMP SPEC: NORMAL
PATH REPORT.RELEVANT HX SPEC: NORMAL

## 2023-03-22 NOTE — CONFIDENTIAL NOTE
Pt had PAP done last week  Wanting to know what LSIL means  Routing to provider to advise.  Kylah Paulino, RN on 3/22/2023 at 3:45 PM

## 2023-03-24 NOTE — CONFIDENTIAL NOTE
We did not test for HPV due to her age. Could still be present but nothing to worry about. We will resume HPV testing if pap smear ever returns abnormal or at age 28.   Thanks, NICOLE ButlerM

## 2023-09-07 ENCOUNTER — VIRTUAL VISIT (OUTPATIENT)
Dept: URGENT CARE | Facility: CLINIC | Age: 23
End: 2023-09-07
Payer: COMMERCIAL

## 2023-09-07 DIAGNOSIS — J45.21 MILD INTERMITTENT ASTHMA WITH ACUTE EXACERBATION: ICD-10-CM

## 2023-09-07 DIAGNOSIS — J06.9 VIRAL URI WITH COUGH: ICD-10-CM

## 2023-09-07 DIAGNOSIS — J01.10 ACUTE NON-RECURRENT FRONTAL SINUSITIS: Primary | ICD-10-CM

## 2023-09-07 PROCEDURE — 99214 OFFICE O/P EST MOD 30 MIN: CPT | Mod: 95

## 2023-09-07 RX ORDER — FLUTICASONE PROPIONATE 50 MCG
2 SPRAY, SUSPENSION (ML) NASAL DAILY
Qty: 16 G | Refills: 3 | Status: SHIPPED | OUTPATIENT
Start: 2023-09-07

## 2023-09-07 RX ORDER — PREDNISONE 20 MG/1
40 TABLET ORAL DAILY
COMMUNITY
Start: 2023-09-05 | End: 2023-09-10

## 2023-09-07 NOTE — LETTER
[facility] VIRTUAL URGENT CARE  [address] [address]  Phone: [phone]    September 7, 2023        Korin Jason  [address]  [facility] [address]          To whom it may concern:    RE: Korin Jason    Patient was seen and treated today and missed work. Please excuse her from work missed 9/4/2023 through 9/8/2023 due to this illness.    Please contact me for questions or concerns.      Sincerely,    Daisy Rocha, [facility]    Virtual Urgent Care

## 2023-09-07 NOTE — PROGRESS NOTES
Assessment & Plan     Acute non-recurrent frontal sinusitis  - fluticasone (FLONASE) 50 MCG/ACT nasal spray; Spray 2 sprays into both nostrils daily    Viral URI with cough  We discussed symptomatic measures including fluids, rest, Tylenol, ibuprofen.      Mild intermittent asthma with acute exacerbation  Continue previously prescribed prednisone daily for 5 days.  Albuterol as needed.  Be seen if symptoms significantly worsen or fail to improve.    Return in about 1 week (around 9/14/2023) for visit with primary care provider if not improving.     Daisy Rocha PA-C  [facility] URGENT CARE CLINICS    Subjective   Korin Jason is a 23 year old who presents for the following health issues    HPI    Korin presents via video for evaluation of presumed sinus infection.  Symptoms first began Saturday evening, 5 days ago.  She had body aches, cough feels it is coming from her chest, tightness in her chest with some shortness of breath with activity.  She has a history of asthma and has albuterol at home which has been helping.  She was seen in the emergency room 2 days ago and diagnosed with a viral URI.  She was started on prednisone for asthma exacerbation as well.  Is concerned because she is not put on a Z-Arden to treat sinusitis.    Review of Systems   ROS negative except as stated above.      Objective    Physical Exam   GENERAL: Healthy, alert and no distress  EYES: Eyes grossly normal to inspection.  No discharge or erythema, or obvious scleral/conjunctival abnormalities.  HENT: Normal cephalic/atraumatic.  External ears, nose and mouth without ulcers or lesions.  No nasal drainage visible.  RESP: No audible cough wheeze or visible cyanosis.  No visible retractions or increased work of breathing.    SKIN: Visible skin clear. No significant rash, abnormal pigmentation or lesions.  NEURO: Cranial nerves grossly intact.  Mentation and speech appropriate for age.  PSYCH: Mentation appears normal, affect  normal/bright, judgement and insight intact, normal speech and appearance well-groomed.    Type of service:  Video Visit  Video Start Time: 9:30AM  Video End Time: 9:41AM  Originating Location (pt. Location): Home  Distant Location (provider location[facility]  [facility]  Platform used for Video Visit: [email or web address]    No results found for any visits on 09/07/23.

## 2023-09-07 NOTE — PATIENT INSTRUCTIONS
No indication for antibiotics discussed.   Rest! Your body needs more rest to heal.  Drink plenty of fluids (warm fluids like tea or soup are soothing and reduce cough)  Sit in the bathroom with a hot shower running and breathe in the steam.  Honey may soothe your sore throat and help manage your cough- may take straight or in warm water with lemon juice.  Avoid smoke from cigarettes, fireplace, bonfire etc.    Take Tylenol or an NSAID such as ibuprofen or naproxen as needed for pain.  Delsym (dextromethorphan) is a 12 hour over the counter cough medication   Sudafed behind the pharmacist counter reduces congestion + an antihistamine  such as Claritin (loratadine), Zyrtec (cetirizine) or Allegra (fexofenadine).  Afrin (oxymetazoline) nasal spray twice daily for 3 days. Stop after 3 days.  Mucinex or Robitussin (guiafenesin) thin mucus and may help it to loosen more quickly    Symptoms usually come on quickly.  Fever usually lasts 1-3 days.  Symptoms are usually the worst around days 3-5.  Nasal congestion often starts clear then turns yellow or green towards the end- this is not a sign of a bacterial infection.  It may take 14 days for symptoms to completely go away.  A cough may persist for 3-4 weeks.  Good handwashing is the best way to prevent spread of the common cold.  Present to emergency room if you develop trouble breathing, swallowing or cough-up blood.  Follow up with your primary care provider if symptoms worsen or fail to improve as expected.

## 2023-09-20 ENCOUNTER — MYC REFILL (OUTPATIENT)
Dept: FAMILY MEDICINE | Facility: CLINIC | Age: 23
End: 2023-09-20
Payer: COMMERCIAL

## 2023-09-20 DIAGNOSIS — J45.40 MODERATE PERSISTENT ASTHMA WITHOUT COMPLICATION: Primary | ICD-10-CM

## 2023-09-21 RX ORDER — ALBUTEROL SULFATE 90 UG/1
2 AEROSOL, METERED RESPIRATORY (INHALATION) EVERY 4 HOURS PRN
Qty: 18 G | Refills: 0 | Status: SHIPPED | OUTPATIENT
Start: 2023-09-21 | End: 2024-02-18

## 2023-12-20 ENCOUNTER — OFFICE VISIT (OUTPATIENT)
Dept: FAMILY MEDICINE | Facility: CLINIC | Age: 23
End: 2023-12-20
Payer: COMMERCIAL

## 2023-12-20 VITALS
DIASTOLIC BLOOD PRESSURE: 67 MMHG | HEIGHT: 66 IN | SYSTOLIC BLOOD PRESSURE: 114 MMHG | HEART RATE: 87 BPM | TEMPERATURE: 97.1 F | BODY MASS INDEX: 47.09 KG/M2 | OXYGEN SATURATION: 97 % | WEIGHT: 293 LBS | RESPIRATION RATE: 14 BRPM

## 2023-12-20 DIAGNOSIS — F17.210 CIGARETTE SMOKER: ICD-10-CM

## 2023-12-20 DIAGNOSIS — R35.1 NOCTURIA: Primary | ICD-10-CM

## 2023-12-20 LAB
ALBUMIN UR-MCNC: NEGATIVE MG/DL
APPEARANCE UR: CLEAR
BILIRUB UR QL STRIP: NEGATIVE
COLOR UR AUTO: YELLOW
GLUCOSE UR STRIP-MCNC: NEGATIVE MG/DL
HBA1C MFR BLD: 5.6 % (ref 0–5.6)
HCG UR QL: NEGATIVE
HGB UR QL STRIP: NEGATIVE
HOLD SPECIMEN: NORMAL
KETONES UR STRIP-MCNC: NEGATIVE MG/DL
LEUKOCYTE ESTERASE UR QL STRIP: NEGATIVE
NITRATE UR QL: NEGATIVE
PH UR STRIP: 6.5 [PH] (ref 5–7)
SP GR UR STRIP: 1.01 (ref 1–1.03)
UROBILINOGEN UR STRIP-ACNC: 0.2 E.U./DL

## 2023-12-20 PROCEDURE — 91320 SARSCV2 VAC 30MCG TRS-SUC IM: CPT | Performed by: FAMILY MEDICINE

## 2023-12-20 PROCEDURE — 81003 URINALYSIS AUTO W/O SCOPE: CPT | Performed by: FAMILY MEDICINE

## 2023-12-20 PROCEDURE — 90480 ADMN SARSCOV2 VAC 1/ONLY CMP: CPT | Performed by: FAMILY MEDICINE

## 2023-12-20 PROCEDURE — 36415 COLL VENOUS BLD VENIPUNCTURE: CPT | Performed by: FAMILY MEDICINE

## 2023-12-20 PROCEDURE — 81025 URINE PREGNANCY TEST: CPT | Performed by: FAMILY MEDICINE

## 2023-12-20 PROCEDURE — 83036 HEMOGLOBIN GLYCOSYLATED A1C: CPT | Performed by: FAMILY MEDICINE

## 2023-12-20 PROCEDURE — 99214 OFFICE O/P EST MOD 30 MIN: CPT | Performed by: FAMILY MEDICINE

## 2023-12-20 ASSESSMENT — PAIN SCALES - GENERAL: PAINLEVEL: NO PAIN (0)

## 2023-12-20 ASSESSMENT — ASTHMA QUESTIONNAIRES: ACT_TOTALSCORE: 19

## 2023-12-20 NOTE — PROGRESS NOTES
"  {PROVIDER CHARTING PREFERENCE:372032}    Subjective   Korin is a 23 year old, presenting for the following health issues:  Urinary Problem and Incontinence (Nighttime only)        12/20/2023     2:06 PM   Additional Questions   Roomed by Jeff DUARTE       History of Present Illness       Reason for visit:  Overactive bladder problems  Symptom onset:  3-7 days ago  Symptoms include:  Inability to make it to the bathroom, its like my body all of a sudden stopped being able to tell my brain to wake up and go to the bathroom. Never had this problem before  Symptom intensity:  Severe  Symptom progression:  Worsening  Had these symptoms before:  No  What makes it better:  Not sleeping    She eats 2-3 servings of fruits and vegetables daily.She consumes 2 sweetened beverage(s) daily.She exercises with enough effort to increase her heart rate 30 to 60 minutes per day.  She exercises with enough effort to increase her heart rate 4 days per week.   She is taking medications regularly.     {Provider Documentation  Patient had ACT/CACT less than 20- Link to exacerbation documentation :911235}  {MA/LPN/RN Pre-Provider Visit Orders- hCG/UA/Strep (Optional):078663}  {SUPERLIST (Optional):743292}  {additonal problems for provider to add (Optional):458378}      Review of Systems   {ROS COMP (Optional):744576}      Objective    /67 (BP Location: Right arm, Patient Position: Sitting, Cuff Size: Adult Large)   Pulse 87   Temp 97.1  F (36.2  C) (Temporal)   Resp 14   Ht 1.677 m (5' 6.02\")   Wt (!) 151.5 kg (334 lb)   LMP 10/31/2023   SpO2 97%   BMI 53.87 kg/m    Body mass index is 53.87 kg/m .  Physical Exam   {Exam List (Optional):126955}    {Diagnostic Test Results (Optional):416617}    {AMBULATORY ATTESTATION (Optional):868215}              "

## 2023-12-20 NOTE — PROGRESS NOTES
"Assessment/Plan.    New-onset nocturia.  Differential includes cystitis, diabetes, anxiety.  History not suggestive of seizure or parasomnia.  -Check labs as below  -Encouraged patient to set voiding alarm for earlier in the night    Orders Placed This Encounter   Procedures    COVID-19 12+ (2023-24) (PFIZER)    UA reflex to Microscopic - lab collect    HCG qualitative urine    Hemoglobin A1c    Extra SST Tube (LAB USE ONLY)       ----  Chief complaint: Urinary Problem and Incontinence (Nighttime only)    Nocturia.  Onset about 1 week ago.  Patient has been waking up to soaked sheets each night.  Has strong urge to pee upon waking.  This typically occurs 1 to 2 hours before her alarm goes off.  Reports only 1 such episode each night.    Patient has also been voiding more frequently during the daytime.  Also notes frequent thirst.  Denies recent hematuria, dysuria, fever or vaginal discharge.    Patient denies significant nocturnal enuresis as a child.  Denies history of recurrent UTI, kidney stones or  surgery.    Upon waking, patient is immediately aware of what is happening.  Denies extremity weakness upon waking.  Denies a history of seizure.    Sometimes has dreams about needing to void.  The enuresis episodes do not always correlate with these dreams.    Last menses about 2 months ago.  History of irregular menses over the past year.  Sexually active.  Not using hormonal contraception.  Last sex without condom about a week ago.    Caffeine intake limited to about 1 can of pop per day.  Denies recent supplement or herbal medication use.    Exam  /67 (BP Location: Right arm, Patient Position: Sitting, Cuff Size: Adult Large)   Pulse 87   Temp 97.1  F (36.2  C) (Temporal)   Resp 14   Ht 1.677 m (5' 6.02\")   Wt (!) 151.5 kg (334 lb)   LMP 10/31/2023   SpO2 97%   BMI 53.87 kg/m    Patient's last menstrual period was 10/31/2023.    Moist oromucosa.  No thyromegaly.  No CVA tenderness.  Abdomen soft, " nontender, no palpable masses.  No lower leg edema.

## 2023-12-26 PROBLEM — F17.210 CIGARETTE SMOKER: Status: ACTIVE | Noted: 2023-12-26

## 2024-01-04 ENCOUNTER — PATIENT OUTREACH (OUTPATIENT)
Dept: CARE COORDINATION | Facility: CLINIC | Age: 24
End: 2024-01-04
Payer: COMMERCIAL

## 2024-01-18 ENCOUNTER — PATIENT OUTREACH (OUTPATIENT)
Dept: CARE COORDINATION | Facility: CLINIC | Age: 24
End: 2024-01-18
Payer: COMMERCIAL

## 2024-02-15 ENCOUNTER — TELEPHONE (OUTPATIENT)
Dept: FAMILY MEDICINE | Facility: CLINIC | Age: 24
End: 2024-02-15
Payer: COMMERCIAL

## 2024-02-15 NOTE — TELEPHONE ENCOUNTER
Patient Quality Outreach    Patient is due for the following:   Asthma  -  ACT needed    Next Steps:   Patient was assigned appropriate questionnaire to complete    Type of outreach:    Sent Setred message.    Next Steps:  Reach out within 90 days via Letter.    Max number of attempts reached: Yes. Will try again in 90 days if patient still on fail list.    Questions for provider review:    None           Brissa Greer CMA  Chart routed to Care Team.

## 2024-02-18 ENCOUNTER — MYC REFILL (OUTPATIENT)
Dept: FAMILY MEDICINE | Facility: CLINIC | Age: 24
End: 2024-02-18
Payer: COMMERCIAL

## 2024-02-18 DIAGNOSIS — J45.40 MODERATE PERSISTENT ASTHMA WITHOUT COMPLICATION: ICD-10-CM

## 2024-02-19 ENCOUNTER — E-VISIT (OUTPATIENT)
Dept: URGENT CARE | Facility: CLINIC | Age: 24
End: 2024-02-19
Payer: COMMERCIAL

## 2024-02-19 DIAGNOSIS — R19.7 DIARRHEA, UNSPECIFIED TYPE: Primary | ICD-10-CM

## 2024-02-19 PROCEDURE — 99207 PR NON-BILLABLE SERV PER CHARTING: CPT | Performed by: NURSE PRACTITIONER

## 2024-02-19 RX ORDER — FLUTICASONE PROPIONATE AND SALMETEROL XINAFOATE 115; 21 UG/1; UG/1
1 AEROSOL, METERED RESPIRATORY (INHALATION) 2 TIMES DAILY
Qty: 12 G | Refills: 10 | Status: SHIPPED | OUTPATIENT
Start: 2024-02-19

## 2024-02-19 RX ORDER — ALBUTEROL SULFATE 90 UG/1
2 AEROSOL, METERED RESPIRATORY (INHALATION) EVERY 4 HOURS PRN
Qty: 18 G | Refills: 0 | Status: SHIPPED | OUTPATIENT
Start: 2024-02-19

## 2024-02-19 NOTE — PATIENT INSTRUCTIONS
Dear Korin Jason,    We are sorry you are not feeling well. Based on the responses you provided, it is recommended that you be seen in-person in urgent care so we can better evaluate your symptoms. Please click here to find the nearest urgent care location to you.   You will not be charged for this Visit. Thank you for trusting us with your care.    Mally Talbot CNP    The black color in your stool is from pepto use.     
No

## 2024-05-05 ENCOUNTER — HEALTH MAINTENANCE LETTER (OUTPATIENT)
Age: 24
End: 2024-05-05

## 2024-06-12 ENCOUNTER — OFFICE VISIT (OUTPATIENT)
Dept: FAMILY MEDICINE | Facility: CLINIC | Age: 24
End: 2024-06-12
Payer: COMMERCIAL

## 2024-06-12 VITALS
RESPIRATION RATE: 18 BRPM | WEIGHT: 293 LBS | SYSTOLIC BLOOD PRESSURE: 104 MMHG | TEMPERATURE: 97.5 F | HEIGHT: 66 IN | HEART RATE: 85 BPM | BODY MASS INDEX: 47.09 KG/M2 | OXYGEN SATURATION: 98 % | DIASTOLIC BLOOD PRESSURE: 75 MMHG

## 2024-06-12 DIAGNOSIS — J01.00 SUBACUTE MAXILLARY SINUSITIS: ICD-10-CM

## 2024-06-12 DIAGNOSIS — J02.9 SORE THROAT: Primary | ICD-10-CM

## 2024-06-12 LAB
DEPRECATED S PYO AG THROAT QL EIA: NEGATIVE
GROUP A STREP BY PCR: NOT DETECTED

## 2024-06-12 PROCEDURE — 99214 OFFICE O/P EST MOD 30 MIN: CPT | Performed by: FAMILY MEDICINE

## 2024-06-12 PROCEDURE — 87651 STREP A DNA AMP PROBE: CPT | Performed by: FAMILY MEDICINE

## 2024-06-12 RX ORDER — CEFDINIR 300 MG/1
300 CAPSULE ORAL 2 TIMES DAILY
Qty: 20 CAPSULE | Refills: 0 | Status: SHIPPED | OUTPATIENT
Start: 2024-06-12 | End: 2024-06-22

## 2024-06-12 ASSESSMENT — ASTHMA QUESTIONNAIRES
ACT_TOTALSCORE: 22
EMERGENCY_ROOM_LAST_YEAR_TOTAL: ONE
ACT_TOTALSCORE: 22
QUESTION_3 LAST FOUR WEEKS HOW OFTEN DID YOUR ASTHMA SYMPTOMS (WHEEZING, COUGHING, SHORTNESS OF BREATH, CHEST TIGHTNESS OR PAIN) WAKE YOU UP AT NIGHT OR EARLIER THAN USUAL IN THE MORNING: NOT AT ALL
QUESTION_5 LAST FOUR WEEKS HOW WOULD YOU RATE YOUR ASTHMA CONTROL: WELL CONTROLLED
QUESTION_4 LAST FOUR WEEKS HOW OFTEN HAVE YOU USED YOUR RESCUE INHALER OR NEBULIZER MEDICATION (SUCH AS ALBUTEROL): NOT AT ALL
QUESTION_1 LAST FOUR WEEKS HOW MUCH OF THE TIME DID YOUR ASTHMA KEEP YOU FROM GETTING AS MUCH DONE AT WORK, SCHOOL OR AT HOME: A LITTLE OF THE TIME
QUESTION_2 LAST FOUR WEEKS HOW OFTEN HAVE YOU HAD SHORTNESS OF BREATH: ONCE OR TWICE A WEEK

## 2024-06-12 ASSESSMENT — ENCOUNTER SYMPTOMS: SORE THROAT: 1

## 2024-06-12 NOTE — PROGRESS NOTES
"  Assessment & Plan   Problem List Items Addressed This Visit    None  Visit Diagnoses       Sore throat    -  Primary    Relevant Orders    Streptococcus A Rapid Screen w/Reflex to PCR - Clinic Collect (Completed)    Group A Streptococcus PCR Throat Swab    Subacute maxillary sinusitis        Relevant Medications    cefdinir (OMNICEF) 300 MG capsule           Strep negative but will treat sinusitis with cefdinir.  Penicillin allergic.  Pending PCR.  Advise she use Zyrtec for itching ears.     Nicotine/Tobacco Cessation  She reports that she has been smoking cigarettes. She has been exposed to tobacco smoke. She has never used smokeless tobacco.  Nicotine/Tobacco Cessation Plan        BMI  Estimated body mass index is 53.23 kg/m  as calculated from the following:    Height as of this encounter: 1.677 m (5' 6.02\").    Weight as of this encounter: 149.7 kg (330 lb).       Bipin Langley is a 23 year old, presenting for the following health issues:  Pharyngitis (Coughing up blood with mucus) and Ear Problem (pain)        6/12/2024     3:06 PM   Additional Questions   Roomed by Brissa RIVERS CMA     History of Present Illness       Reason for visit:  Sore throat and coughing up blood  Symptom onset:  1-3 days ago  Symptoms include:  Sore throat, ear aches, blood in mucus  Symptom intensity:  Moderate  Symptom progression:  Worsening  Had these symptoms before:  No  What makes it worse:  No  What makes it better:  No    She eats 2-3 servings of fruits and vegetables daily.She consumes 1 sweetened beverage(s) daily.She exercises with enough effort to increase her heart rate 20 to 29 minutes per day.  She exercises with enough effort to increase her heart rate 4 days per week.   She is taking medications regularly.     Itching ears bilateral.  Months.      Objective    /75 (BP Location: Right arm, Patient Position: Chair, Cuff Size: Adult Large)   Pulse 85   Temp 97.5  F (36.4  C) (Temporal)   Resp 18   Ht " "1.677 m (5' 6.02\")   Wt 149.7 kg (330 lb)   LMP  (LMP Unknown)   SpO2 98%   BMI 53.23 kg/m    Body mass index is 53.23 kg/m .  Physical Exam   GENERAL: alert and no distress  HENT: ear canals and TM's normal, nose and mouth without ulcers or lesions  NECK: + adenopathy, no asymmetry, masses, or scars  RESP: lungs clear to auscultation - no rales, rhonchi or wheezes  CV: regular rate and rhythm, normal S1 S2, no S3 or S4, no murmur, click or rub, no peripheral edema    Results for orders placed or performed in visit on 06/12/24   Streptococcus A Rapid Screen w/Reflex to PCR - Clinic Collect     Status: Normal    Specimen: Throat; Swab   Result Value Ref Range    Group A Strep antigen Negative Negative           Signed Electronically by: CHARMAINE SOOD DO    "

## 2024-07-25 ENCOUNTER — PATIENT OUTREACH (OUTPATIENT)
Dept: CARE COORDINATION | Facility: CLINIC | Age: 24
End: 2024-07-25
Payer: COMMERCIAL

## 2024-09-23 ENCOUNTER — VIRTUAL VISIT (OUTPATIENT)
Dept: URGENT CARE | Facility: CLINIC | Age: 24
End: 2024-09-23
Payer: COMMERCIAL

## 2024-09-23 DIAGNOSIS — J02.9 SORE THROAT: ICD-10-CM

## 2024-09-23 DIAGNOSIS — H92.03 OTALGIA OF BOTH EARS: Primary | ICD-10-CM

## 2024-09-23 PROCEDURE — 99207 PR NON-BILLABLE SERV PER CHARTING: CPT

## 2024-09-23 NOTE — PROGRESS NOTES
Korin is a 24 year old female who presents for a billable video visit.    ASSESSMENT/PLAN:    ICD-10-CM    1. Otalgia of both ears  H92.03       2. Sore throat  J02.9           Korin Jason is a 24 year old who presents for chief complaint of severe bilateral ear pain for the last few days.  She also reports this morning she woke up with a sore throat.  I am recommending that she be seen in person for physical evaluation for symptoms.    Patient verbalized understanding and is agreeable to plan.     No charge for today's assessment.

## 2024-10-24 ENCOUNTER — OFFICE VISIT (OUTPATIENT)
Dept: FAMILY MEDICINE | Facility: CLINIC | Age: 24
End: 2024-10-24
Payer: COMMERCIAL

## 2024-10-24 VITALS
TEMPERATURE: 98.1 F | BODY MASS INDEX: 47.09 KG/M2 | HEIGHT: 66 IN | OXYGEN SATURATION: 98 % | SYSTOLIC BLOOD PRESSURE: 112 MMHG | WEIGHT: 293 LBS | RESPIRATION RATE: 20 BRPM | HEART RATE: 78 BPM | DIASTOLIC BLOOD PRESSURE: 68 MMHG

## 2024-10-24 DIAGNOSIS — Z00.00 ROUTINE GENERAL MEDICAL EXAMINATION AT A HEALTH CARE FACILITY: ICD-10-CM

## 2024-10-24 DIAGNOSIS — Z13.220 LIPID SCREENING: ICD-10-CM

## 2024-10-24 DIAGNOSIS — H60.62 CHRONIC NON-INFECTIVE OTITIS EXTERNA OF LEFT EAR, UNSPECIFIED TYPE: ICD-10-CM

## 2024-10-24 DIAGNOSIS — Z23 NEED FOR VACCINATION: ICD-10-CM

## 2024-10-24 DIAGNOSIS — E66.01 MORBID OBESITY (H): ICD-10-CM

## 2024-10-24 DIAGNOSIS — Z11.3 ROUTINE SCREENING FOR STI (SEXUALLY TRANSMITTED INFECTION): Primary | ICD-10-CM

## 2024-10-24 DIAGNOSIS — Z13.1 SCREENING FOR DIABETES MELLITUS: ICD-10-CM

## 2024-10-24 DIAGNOSIS — E04.1 THYROID NODULE: ICD-10-CM

## 2024-10-24 LAB
EST. AVERAGE GLUCOSE BLD GHB EST-MCNC: 120 MG/DL
HBA1C MFR BLD: 5.8 % (ref 0–5.6)
HIV 1+2 AB+HIV1 P24 AG SERPL QL IA: NONREACTIVE
T PALLIDUM AB SER QL: NONREACTIVE

## 2024-10-24 PROCEDURE — 80061 LIPID PANEL: CPT | Performed by: FAMILY MEDICINE

## 2024-10-24 PROCEDURE — 36415 COLL VENOUS BLD VENIPUNCTURE: CPT | Performed by: FAMILY MEDICINE

## 2024-10-24 PROCEDURE — 86780 TREPONEMA PALLIDUM: CPT | Performed by: FAMILY MEDICINE

## 2024-10-24 PROCEDURE — 87389 HIV-1 AG W/HIV-1&-2 AB AG IA: CPT | Performed by: FAMILY MEDICINE

## 2024-10-24 PROCEDURE — 87591 N.GONORRHOEAE DNA AMP PROB: CPT | Performed by: FAMILY MEDICINE

## 2024-10-24 PROCEDURE — 87491 CHLMYD TRACH DNA AMP PROBE: CPT | Performed by: FAMILY MEDICINE

## 2024-10-24 PROCEDURE — 83036 HEMOGLOBIN GLYCOSYLATED A1C: CPT | Performed by: FAMILY MEDICINE

## 2024-10-24 PROCEDURE — 84443 ASSAY THYROID STIM HORMONE: CPT | Performed by: FAMILY MEDICINE

## 2024-10-24 RX ORDER — CIPROFLOXACIN AND DEXAMETHASONE 3; 1 MG/ML; MG/ML
4 SUSPENSION/ DROPS AURICULAR (OTIC) 2 TIMES DAILY
Qty: 7.5 ML | Refills: 2 | Status: SHIPPED | OUTPATIENT
Start: 2024-10-24

## 2024-10-24 SDOH — HEALTH STABILITY: PHYSICAL HEALTH: ON AVERAGE, HOW MANY DAYS PER WEEK DO YOU ENGAGE IN MODERATE TO STRENUOUS EXERCISE (LIKE A BRISK WALK)?: 5 DAYS

## 2024-10-24 SDOH — HEALTH STABILITY: PHYSICAL HEALTH: ON AVERAGE, HOW MANY MINUTES DO YOU ENGAGE IN EXERCISE AT THIS LEVEL?: 30 MIN

## 2024-10-24 ASSESSMENT — SOCIAL DETERMINANTS OF HEALTH (SDOH): HOW OFTEN DO YOU GET TOGETHER WITH FRIENDS OR RELATIVES?: TWICE A WEEK

## 2024-10-24 NOTE — PROGRESS NOTES
"Preventive Care Visit  [facility]  Oleksanrd Bernal DO, Family Medicine  Oct 24, 2024      Assessment & Plan     Routine screening for STI (sexually transmitted infection)  - Treponema Abs w Reflex to RPR and Titer; Future  - HIV Antigen Antibody Combo Cascade; Future  - Chlamydia trachomatis/Neisseria gonorrhoeae by PCR; Future  - Treponema Abs w Reflex to RPR and Titer  - HIV Antigen Antibody Combo Cascade  - Chlamydia trachomatis/Neisseria gonorrhoeae by PCR    Thyroid nodule  - TSH with free T4 reflex; Future  - US Thyroid; Future  - TSH with free T4 reflex    Morbid obesity (H)  - Adult Comprehensive Weight Management  Referral; Future    Need for vaccination  - INFLUENZA VACCINE,SPLIT VIRUS,TRIVALENT,PF(FLUZONE)  - COVID-19 12+ (PFIZER)    Routine general medical examination at a health care facility  - REVIEW OF HEALTH MAINTENANCE PROTOCOL ORDERS    Chronic non-infective otitis externa of left ear, unspecified type  - ciprofloxacin-dexAMETHasone (CIPRODEX) 0.3-0.1 % otic suspension; Place 4 drops into both ears 2 times daily.    Lipid screening  - Lipid panel reflex to direct LDL Fasting; Future  - Lipid panel reflex to direct LDL Fasting    Screening for diabetes mellitus  - Hemoglobin A1c; Future  - Hemoglobin A1c      BMI  Estimated body mass index is 54.64 kg/m  as calculated from the following:    Height as of this encounter: 1.68 m (5' 6.14\").    Weight as of this encounter: 154.2 kg (340 lb).   Weight management plan: Discussed healthy diet and exercise guidelines    Counseling  Appropriate preventive services were addressed with this patient via screening, questionnaire, or discussion as appropriate for fall prevention, nutrition, physical activity, Tobacco-use cessation, social engagement, weight loss and cognition.  Checklist reviewing preventive services available has been given to the patient.        Bipin Langley is a 24 year old, presenting for " the following:    Physical        10/24/2024    10:58 AM   Additional Questions   Roomed by Talita ALBRIGHT    Ear itching   Flonase every day didn't help nor zyrtec     Pap due 2026         10/24/2024   General Health   How would you rate your overall physical health? (!) FAIR   Feel stress (tense, anxious, or unable to sleep) Not at all            10/24/2024   Nutrition   Three or more servings of calcium each day? Yes   Diet: Regular (no restrictions)   How many servings of fruit and vegetables per day? (!) 2-3   How many sweetened beverages each day? 0-1            10/24/2024   Exercise   Days per week of moderate/strenous exercise 5 days   Average minutes spent exercising at this level 30 min            10/24/2024   Social Factors   Frequency of gathering with friends or relatives Twice a week   Worry food won't last until get money to buy more No   Food not last or not have enough money for food? No   Do you have housing? (Housing is defined as stable permanent housing and does not include staying ouside in a car, in a tent, in an abandoned building, in an overnight shelter, or couch-surfing.) Yes   Are you worried about losing your housing? No   Lack of transportation? No   Unable to get utilities (heat,electricity)? No            10/24/2024   Dental   Dentist two times every year? Yes            10/24/2024   TB Screening   Were you born outside of the [place]? No              10/24/2024   Substance Use   If I could quit smoking, I would Completely agree   I want to quit somking, worry about health affects Completely agree   Willing to make a plan to quit smoking Neutral   Willing to cut down before quitting Completely agree   Alcohol more than 3/day or more than 7/wk No   Do you use any other substances recreationally? (!) CANNABIS PRODUCTS        Social History     Tobacco Use    Smoking status: Every Day     Current packs/day: 0.50     Types: Cigarettes     Passive exposure: Current    Smokeless tobacco:  "Never   Vaping Use    Vaping status: Some Days    Substances: THC    Devices: Disposable    Passive vaping exposure: Yes   Substance Use Topics    Alcohol use: Yes    Drug use: Never                10/24/2024   STI Screening   New sexual partner(s) since last STI/HIV test? No      Grandmother on moms side with breast cancer on mom's side     History of abnormal Pap smear: YES - reflected in Problem List and Health Maintenance accordingly        Latest Ref Rng & Units 3/17/2023     1:21 PM 3/12/2021    11:13 AM 3/12/2021    11:06 AM   PAP / HPV   PAP  Negative for Intraepithelial Lesion or Malignancy (NILM)      PAP (Historical)    NIL    HPV 16 DNA NEG^Negative  Negative     HPV 18 DNA NEG^Negative  Negative     Other HR HPV NEG^Negative  Positive             10/24/2024   Contraception/Family Planning   Questions about contraception or family planning No           Reviewed and updated as needed this visit by Provider   Tobacco      Surg Hx  Fam Hx  Soc Hx Sexual Activity             Objective    Exam  /68 (BP Location: Right arm, Patient Position: Sitting, Cuff Size: Adult Large)   Pulse 78   Temp 98.1  F (36.7  C) (Temporal)   Resp 20   Ht 1.68 m (5' 6.14\")   Wt (!) 154.2 kg (340 lb)   LMP 09/04/2024 (Approximate)   SpO2 98%   BMI 54.64 kg/m     Estimated body mass index is 54.64 kg/m  as calculated from the following:    Height as of this encounter: 1.68 m (5' 6.14\").    Weight as of this encounter: 154.2 kg (340 lb).    Physical Exam  Constitutional:       General: She is not in acute distress.  HENT:      Head: Normocephalic.      Right Ear: Tympanic membrane normal.      Left Ear: Tympanic membrane normal.      Mouth/Throat:      Mouth: Mucous membranes are moist.      Pharynx: Oropharynx is clear.   Eyes:      General: No scleral icterus.  Cardiovascular:      Rate and Rhythm: Normal rate and regular rhythm.      Heart sounds: Normal heart sounds.   Pulmonary:      Effort: No respiratory " distress.      Breath sounds: Normal breath sounds.   Musculoskeletal:      Cervical back: No tenderness.      Right lower leg: No edema.      Left lower leg: No edema.   Lymphadenopathy:      Cervical: No cervical adenopathy.   Skin:     General: Skin is warm.   Neurological:      General: No focal deficit present.      Mental Status: She is alert.   Psychiatric:         Mood and Affect: Mood normal.         Behavior: Behavior normal.           Signed Electronically by: Oleksandr Bernal DO

## 2024-10-25 ENCOUNTER — HOSPITAL ENCOUNTER (OUTPATIENT)
Dept: ULTRASOUND IMAGING | Facility: CLINIC | Age: 24
Discharge: HOME | End: 2024-10-25
Attending: FAMILY MEDICINE | Admitting: FAMILY MEDICINE
Payer: COMMERCIAL

## 2024-10-25 DIAGNOSIS — R73.03 PREDIABETES: Primary | ICD-10-CM

## 2024-10-25 DIAGNOSIS — E04.1 THYROID NODULE: ICD-10-CM

## 2024-10-25 LAB
C TRACH DNA SPEC QL PROBE+SIG AMP: NEGATIVE
CHOLEST SERPL-MCNC: 174 MG/DL
FASTING STATUS PATIENT QL REPORTED: NO
HDLC SERPL-MCNC: 39 MG/DL
LDLC SERPL CALC-MCNC: 111 MG/DL
N GONORRHOEA DNA SPEC QL NAA+PROBE: NEGATIVE
NONHDLC SERPL-MCNC: 135 MG/DL
TRIGL SERPL-MCNC: 121 MG/DL
TSH SERPL DL<=0.005 MIU/L-ACNC: 2.11 UIU/ML (ref 0.3–4.2)

## 2024-10-25 PROCEDURE — 76536 US EXAM OF HEAD AND NECK: CPT

## 2024-10-25 PROCEDURE — 76536 US EXAM OF HEAD AND NECK: CPT | Mod: 26 | Performed by: RADIOLOGY

## 2024-11-15 NOTE — PATIENT INSTRUCTIONS
Patient Education   Preventive Care Advice   This is general advice given by our system to help you stay healthy. However, your care team may have specific advice just for you. Please talk to your care team about your preventive care needs.  Nutrition  Eat 5 or more servings of fruits and vegetables each day.  Try wheat bread, brown rice and whole grain pasta (instead of white bread, rice, and pasta).  Get enough calcium and vitamin D. Check the label on foods and aim for 100% of the RDA (recommended daily allowance).  Lifestyle  Exercise at least 150 minutes each week  (30 minutes a day, 5 days a week).  Do muscle strengthening activities 2 days a week. These help control your weight and prevent disease.  No smoking.  Wear sunscreen to prevent skin cancer.  Have a dental exam and cleaning every 6 months.  Yearly exams  See your health care team every year to talk about:  Any changes in your health.  Any medicines your care team has prescribed.  Preventive care, family planning, and ways to prevent chronic diseases.  Shots (vaccines)   HPV shots (up to age 26), if you've never had them before.  Hepatitis B shots (up to age 59), if you've never had them before.  COVID-19 shot: Get this shot when it's due.  Flu shot: Get a flu shot every year.  Tetanus shot: Get a tetanus shot every 10 years.  Pneumococcal, hepatitis A, and RSV shots: Ask your care team if you need these based on your risk.  Shingles shot (for age 50 and up)  General health tests  Diabetes screening:  Starting at age 35, Get screened for diabetes at least every 3 years.  If you are younger than age 35, ask your care team if you should be screened for diabetes.  Cholesterol test: At age 39, start having a cholesterol test every 5 years, or more often if advised.  Bone density scan (DEXA): At age 50, ask your care team if you should have this scan for osteoporosis (brittle bones).  Hepatitis C: Get tested at least once in your life.  STIs (sexually  transmitted infections)  Before age 24: Ask your care team if you should be screened for STIs.  After age 24: Get screened for STIs if you're at risk. You are at risk for STIs (including HIV) if:  You are sexually active with more than one person.  You don't use condoms every time.  You or a partner was diagnosed with a sexually transmitted infection.  If you are at risk for HIV, ask about PrEP medicine to prevent HIV.  Get tested for HIV at least once in your life, whether you are at risk for HIV or not.  Cancer screening tests  Cervical cancer screening: If you have a cervix, begin getting regular cervical cancer screening tests starting at age 21.  Breast cancer scan (mammogram): If you've ever had breasts, begin having regular mammograms starting at age 40. This is a scan to check for breast cancer.  Colon cancer screening: It is important to start screening for colon cancer at age 45.  Have a colonoscopy test every 10 years (or more often if you're at risk) Or, ask your provider about stool tests like a FIT test every year or Cologuard test every 3 years.  To learn more about your testing options, visit:   .  For help making a decision, visit:   https://bit.ly/sb89216.  Prostate cancer screening test: If you have a prostate, ask your care team if a prostate cancer screening test (PSA) at age 55 is right for you.  Lung cancer screening: If you are a current or former smoker ages 50 to 80, ask your care team if ongoing lung cancer screenings are right for you.  For informational purposes only. Not to replace the advice of your health care provider. Copyright   2023 [employer]. All rights reserved. Clinically reviewed by the [facility] Transitions Program. [employer] [id number] - REV 01/24.

## 2024-11-29 ENCOUNTER — VIRTUAL VISIT (OUTPATIENT)
Dept: EDUCATION SERVICES | Facility: CLINIC | Age: 24
End: 2024-11-29
Attending: FAMILY MEDICINE
Payer: COMMERCIAL

## 2024-11-29 DIAGNOSIS — R73.03 PREDIABETES: ICD-10-CM

## 2024-11-29 DIAGNOSIS — E66.01 MORBID OBESITY (H): Primary | ICD-10-CM

## 2024-11-29 PROCEDURE — 97802 MEDICAL NUTRITION INDIV IN: CPT | Mod: 95 | Performed by: DIETITIAN, REGISTERED

## 2024-11-29 NOTE — LETTER
11/29/2024         RE: Korin Jason  [address]  [place] [address]        Dear Colleague,    Thank you for referring your patient, Korin Jason, to the [facility] [place]. Please see a copy of my visit note below.    Medical Nutrition Therapy for Diabetes  Visit Type:Initial assessment and intervention  Korin Jason presents today for MNT and education related to prediabetes.   She is accompanied by self.   Joined the call at 11/29/2024, 7:30:23 am.  Left the call at 11/29/2024, 8:11:54 am.  You were on the call for 41 minutes 30 seconds .    ASSESSMENT:   It was a pleasure meeting with Korin.  Discussed aggressive glycemic management for early intervention in Prediabetes.  Overview of pathophysiology and common causes of insulin resistance reviewed.  Strategies to modify, halt, reverse Prediabetes progression and prevent/delay Diabetes were discussed including dietary intervention, physical activity/movement, weight management, and lifestyle factors.   Was on metfformin for awhile in the past when A1c was higher and discussed the action of this medication.  Has been trying to lose weight for sometime now, had maintained for a long time, but then some gain over the last year.  Has been doing well with exercise and activity; stairs, walking, treadmill, working out legs etc.  Discussed the Adult Comprehensive Weight Management  Referral; she will investigate coverage and options. Will have pharmacy liaison team run test claims on medications.    Diet food notes;   Unhealthy relationship with food long term and discussed history   Snacking during the day  (works at ), sometimes ordered foods and then making dinner when home   Eating out more - time / exhaustion once home   Purchasing less snacks at home and has adjusted to this   Eats more at work than at home, but small amounts / snacks   Having a cup of milk before snacking for liquid /  "fullness   Protein yogurt / stress eating  - very prone to this and a hard year   The moment she buys anything for herself the food is gone - kids eat it.  Keeps some foods at work and might even get a mini fridge at home to keep specific items separate from family.   Fruits & veggies - loves and always has   SOCIO/ECONOMIC:   Lives with: family, kids    MEDICATIONS:  Current Outpatient Medications   Medication Sig Dispense Refill     albuterol (PROAIR HFA/PROVENTIL HFA/VENTOLIN HFA) 108 (90 Base) MCG/ACT inhaler Inhale 2 puffs into the lungs every 4 hours as needed for shortness of breath or wheezing 18 g 0     ciprofloxacin-dexAMETHasone (CIPRODEX) 0.3-0.1 % otic suspension Place 4 drops into both ears 2 times daily. 7.5 mL 2     fluticasone (FLONASE) 50 MCG/ACT nasal spray Spray 2 sprays into both nostrils daily 16 g 3     fluticasone-salmeterol (ADVAIR HFA) 115-21 MCG/ACT inhaler Inhale 1 puff into the lungs 2 times daily (Patient not taking: Reported on 10/24/2024) 12 g 10     ibuprofen (ADVIL/MOTRIN) 200 MG capsule Take 200 mg by mouth every 4 hours as needed for fever       triamcinolone (KENALOG) 0.1 % external ointment Apply topically 2 times daily As needed for dry skin 30 g 3     No current facility-administered medications for this visit.       LABS:  Lab Results   Component Value Date    A1C 5.8 10/24/2024    A1C 5.6 12/20/2023       No results found for: \"GLC\"  Lab Results   Component Value Date     10/24/2024     Direct Measure HDL   Date Value Ref Range Status   10/24/2024 39 (L) >=50 mg/dL Final   ]  No results found for: \"GFRESTIMATED\"  No results found for: \"CR\"  No results found for: \"MICROALBUMIN\"    ANTHROPOMETRICS:    Wt Readings from Last 5 Encounters:   10/24/24 (!) 154.2 kg (340 lb)   06/12/24 149.7 kg (330 lb)   12/20/23 (!) 151.5 kg (334 lb)   03/17/23 145.6 kg (321 lb)   01/27/23 140.2 kg (309 lb)       NUTRITION INTERVENTION:  Education given to support: general nutrition " guidelines, consistent meals, carb counting, exercise, dining out/special occasions, fiber, behavior modification, and portion control    PATIENT'S BEHAVIOR CHANGE GOALS:   See Patient Instructions for patient stated behavior change goals. AVS was printed and given to patient at today's appointment.    MONITOR / EVALUATE:  A1c  / labs     FOLLOW-UP:  Follow up with RD as needed.  Call RD with questions/concerns.   Gregoria Conroy RD, LD, [facility]  Diabetes Education    Time spent in minutes: 45mnt  Encounter: Individual

## 2024-11-29 NOTE — PROGRESS NOTES
Medical Nutrition Therapy for Diabetes  Visit Type:Initial assessment and intervention  Korin Jason presents today for MNT and education related to prediabetes.   She is accompanied by self.   Joined the call at 11/29/2024, 7:30:23 am.  Left the call at 11/29/2024, 8:11:54 am.  You were on the call for 41 minutes 30 seconds .    ASSESSMENT:   It was a pleasure meeting with Korin.  Discussed aggressive glycemic management for early intervention in Prediabetes.  Overview of pathophysiology and common causes of insulin resistance reviewed.  Strategies to modify, halt, reverse Prediabetes progression and prevent/delay Diabetes were discussed including dietary intervention, physical activity/movement, weight management, and lifestyle factors.   Was on metfformin for awhile in the past when A1c was higher and discussed the action of this medication.  Has been trying to lose weight for sometime now, had maintained for a long time, but then some gain over the last year.  Has been doing well with exercise and activity; stairs, walking, treadmill, working out legs etc.  Discussed the Adult Comprehensive Weight Management  Referral; she will investigate coverage and options. Will have pharmacy liaison team run test claims on medications.    Diet food notes;   Unhealthy relationship with food long term and discussed history   Snacking during the day  (works at ), sometimes ordered foods and then making dinner when home   Eating out more - time / exhaustion once home   Purchasing less snacks at home and has adjusted to this   Eats more at work than at home, but small amounts / snacks   Having a cup of milk before snacking for liquid / fullness   Protein yogurt / stress eating  - very prone to this and a hard year   The moment she buys anything for herself the food is gone - kids eat it.  Keeps some foods at work and might even get a mini fridge at home to keep specific items separate from family.   Fruits &  "veggies - loves and always has   SOCIO/ECONOMIC:   Lives with: family, kids    MEDICATIONS:  Current Outpatient Medications   Medication Sig Dispense Refill    albuterol (PROAIR HFA/PROVENTIL HFA/VENTOLIN HFA) 108 (90 Base) MCG/ACT inhaler Inhale 2 puffs into the lungs every 4 hours as needed for shortness of breath or wheezing 18 g 0    ciprofloxacin-dexAMETHasone (CIPRODEX) 0.3-0.1 % otic suspension Place 4 drops into both ears 2 times daily. 7.5 mL 2    fluticasone (FLONASE) 50 MCG/ACT nasal spray Spray 2 sprays into both nostrils daily 16 g 3    fluticasone-salmeterol (ADVAIR HFA) 115-21 MCG/ACT inhaler Inhale 1 puff into the lungs 2 times daily (Patient not taking: Reported on 10/24/2024) 12 g 10    ibuprofen (ADVIL/MOTRIN) 200 MG capsule Take 200 mg by mouth every 4 hours as needed for fever      triamcinolone (KENALOG) 0.1 % external ointment Apply topically 2 times daily As needed for dry skin 30 g 3     No current facility-administered medications for this visit.       LABS:  Lab Results   Component Value Date    A1C 5.8 10/24/2024    A1C 5.6 12/20/2023       No results found for: \"GLC\"  Lab Results   Component Value Date     10/24/2024     Direct Measure HDL   Date Value Ref Range Status   10/24/2024 39 (L) >=50 mg/dL Final   ]  No results found for: \"GFRESTIMATED\"  No results found for: \"CR\"  No results found for: \"MICROALBUMIN\"    ANTHROPOMETRICS:    Wt Readings from Last 5 Encounters:   10/24/24 (!) 154.2 kg (340 lb)   06/12/24 149.7 kg (330 lb)   12/20/23 (!) 151.5 kg (334 lb)   03/17/23 145.6 kg (321 lb)   01/27/23 140.2 kg (309 lb)       NUTRITION INTERVENTION:  Education given to support: general nutrition guidelines, consistent meals, carb counting, exercise, dining out/special occasions, fiber, behavior modification, and portion control    PATIENT'S BEHAVIOR CHANGE GOALS:   See Patient Instructions for patient stated behavior change goals. AVS was printed and given to patient at today's " appointment.    MONITOR / EVALUATE:  A1c  / labs     FOLLOW-UP:  Follow up with RD as needed.  Call RD with questions/concerns.   Gregoria Conroy RD, LD, [facility]  Diabetes Education    Time spent in minutes: 45mnt  Encounter: Individual

## 2025-05-04 ASSESSMENT — ASTHMA QUESTIONNAIRES
QUESTION_4 LAST FOUR WEEKS HOW OFTEN HAVE YOU USED YOUR RESCUE INHALER OR NEBULIZER MEDICATION (SUCH AS ALBUTEROL): NOT AT ALL
QUESTION_5 LAST FOUR WEEKS HOW WOULD YOU RATE YOUR ASTHMA CONTROL: WELL CONTROLLED
QUESTION_2 LAST FOUR WEEKS HOW OFTEN HAVE YOU HAD SHORTNESS OF BREATH: ONCE OR TWICE A WEEK
ACT_TOTALSCORE: 22
QUESTION_3 LAST FOUR WEEKS HOW OFTEN DID YOUR ASTHMA SYMPTOMS (WHEEZING, COUGHING, SHORTNESS OF BREATH, CHEST TIGHTNESS OR PAIN) WAKE YOU UP AT NIGHT OR EARLIER THAN USUAL IN THE MORNING: ONCE OR TWICE
QUESTION_1 LAST FOUR WEEKS HOW MUCH OF THE TIME DID YOUR ASTHMA KEEP YOU FROM GETTING AS MUCH DONE AT WORK, SCHOOL OR AT HOME: NONE OF THE TIME

## 2025-05-07 ENCOUNTER — RESULTS FOLLOW-UP (OUTPATIENT)
Dept: FAMILY MEDICINE | Facility: CLINIC | Age: 25
End: 2025-05-07

## 2025-05-07 ENCOUNTER — OFFICE VISIT (OUTPATIENT)
Dept: FAMILY MEDICINE | Facility: CLINIC | Age: 25
End: 2025-05-07
Payer: COMMERCIAL

## 2025-05-07 VITALS
DIASTOLIC BLOOD PRESSURE: 74 MMHG | RESPIRATION RATE: 14 BRPM | SYSTOLIC BLOOD PRESSURE: 122 MMHG | HEIGHT: 66 IN | OXYGEN SATURATION: 98 % | WEIGHT: 293 LBS | HEART RATE: 82 BPM | BODY MASS INDEX: 47.09 KG/M2 | TEMPERATURE: 98.1 F

## 2025-05-07 DIAGNOSIS — L68.0 HIRSUTISM: Primary | ICD-10-CM

## 2025-05-07 DIAGNOSIS — E66.813 CLASS 3 SEVERE OBESITY WITHOUT SERIOUS COMORBIDITY WITH BODY MASS INDEX (BMI) OF 50.0 TO 59.9 IN ADULT, UNSPECIFIED OBESITY TYPE (H): ICD-10-CM

## 2025-05-07 DIAGNOSIS — N91.2 AMENORRHEA: ICD-10-CM

## 2025-05-07 DIAGNOSIS — E66.01 MORBID OBESITY (H): ICD-10-CM

## 2025-05-07 LAB — HCG UR QL: NEGATIVE

## 2025-05-07 PROCEDURE — 3078F DIAST BP <80 MM HG: CPT | Performed by: FAMILY MEDICINE

## 2025-05-07 PROCEDURE — 99214 OFFICE O/P EST MOD 30 MIN: CPT | Performed by: FAMILY MEDICINE

## 2025-05-07 PROCEDURE — 3074F SYST BP LT 130 MM HG: CPT | Performed by: FAMILY MEDICINE

## 2025-05-07 PROCEDURE — 81025 URINE PREGNANCY TEST: CPT | Performed by: FAMILY MEDICINE

## 2025-05-07 NOTE — PROGRESS NOTES
"  Assessment & Plan     Hirsutism:  - Likely related to PCOS.  - Perform an ultrasound to check for polycystic ovarian syndrome. Referral to OBGYN for further evaluation and management.    Amenorrhea:  - Likely related to PCOS.  - Perform an ultrasound to check for polycystic ovarian syndrome. Referral to OBGYN for further evaluation and management.    Morbid obesity (H):  - Discussed potential use of Wegovy for weight loss. Insurance coverage for Wegovy may be possible.  - Attempt to obtain insurance coverage for Wegovy. Schedule a follow-up appointment in 3 1/2 weeks via video to decide on dosing. Perform a pregnancy test to ensure safety before starting Wegovy. Referral to OBGYN for discussions related to weight loss and conception.  - Risks and side effects: Discussed potential side effects of Wegovy, including nausea, vomiting, diarrhea, constipation, and muscle wasting if protein intake is insufficient. Emphasized the importance of monitoring protein intake and adjusting diet accordingly.    Consent was obtained from the patient to use an AI documentation tool in the creation of this note.          BMI  Estimated body mass index is 53.68 kg/m  as calculated from the following:    Height as of this encounter: 1.68 m (5' 6.14\").    Weight as of this encounter: 151.5 kg (334 lb).     Bipin Langley is a 24 year old, presenting for the following health issues:    Weight Loss (Saw a while back about weight loss-got referral to weight management, went and they were pushing for weight loss surgery which she is not interested in so told to see PCP to look into other options-interested in talking about weight loss injections), Establish Care (Questioning the name as PCP-only saw like twice for ear issues-this is here primary clinic ), and Gyn Problem (Questions having PCOS-believes she was \"tested\" few years ago and said didn't have it but believes she really does or has some kind of hormonal imbalance )        " "5/7/2025    11:02 AM   Additional Questions   Roomed by Talita     History of Present Illness       Reason for visit:  Discuss options for weight loss management    She eats 2-3 servings of fruits and vegetables daily.She consumes 2 sweetened beverage(s) daily.She exercises with enough effort to increase her heart rate 30 to 60 minutes per day.  She exercises with enough effort to increase her heart rate 4 days per week.   She is taking medications regularly.   Korin Jason, 24 years    Amenorrhea and PCOS Symptoms  - Absence of menstrual periods for at least 6 months  - Presence of menstrual symptoms such as mood swings and cramping without bleeding  - No spotting between periods  - Increased facial hair growth, requiring frequent shaving  - Mood swings and irritability, similar to premenstrual symptoms  - Attempted ovulation tracking for 3 months without ovulation    Weight Loss Difficulty  - Inability to lose weight despite consistent efforts since January, including a calorie deficit and exercising at least 3 times a week  - Minimal weight loss of approximately 5 lbs  - Previous referral for weight management and discussion of alternative medications and injections    Nausea  - Random episodes of nausea without vomiting, feeling as if about to vomit but does not occur          Objective    /74 (BP Location: Right arm, Patient Position: Sitting, Cuff Size: Adult Large)   Pulse 82   Temp 98.1  F (36.7  C) (Temporal)   Resp 14   Ht 1.68 m (5' 6.14\")   Wt (!) 151.5 kg (334 lb)   LMP  (LMP Unknown)   SpO2 98%   BMI 53.68 kg/m    Body mass index is 53.68 kg/m .  Physical Exam  Constitutional:       General: She is not in acute distress.  Eyes:      General: No scleral icterus.  Pulmonary:      Effort: No respiratory distress.   Neurological:      Mental Status: She is alert.   Psychiatric:         Mood and Affect: Mood normal.         Behavior: Behavior normal.                  Signed Electronically " by: Oleksandr Bernal, DO

## 2025-05-08 ENCOUNTER — PATIENT OUTREACH (OUTPATIENT)
Dept: CARE COORDINATION | Facility: CLINIC | Age: 25
End: 2025-05-08
Payer: COMMERCIAL

## 2025-05-08 ENCOUNTER — TELEPHONE (OUTPATIENT)
Dept: FAMILY MEDICINE | Facility: CLINIC | Age: 25
End: 2025-05-08
Payer: COMMERCIAL

## 2025-05-08 DIAGNOSIS — E66.813 CLASS 3 SEVERE OBESITY WITHOUT SERIOUS COMORBIDITY WITH BODY MASS INDEX (BMI) OF 50.0 TO 59.9 IN ADULT, UNSPECIFIED OBESITY TYPE (H): Primary | ICD-10-CM

## 2025-05-12 ENCOUNTER — PATIENT OUTREACH (OUTPATIENT)
Dept: CARE COORDINATION | Facility: CLINIC | Age: 25
End: 2025-05-12
Payer: COMMERCIAL

## 2025-05-12 NOTE — TELEPHONE ENCOUNTER
Retail Pharmacy Prior Authorization Team   Phone: [phone]    PA Initiation    Medication: WEGOVY 0.25 MG/0.5ML SC SOAJ  Insurance Company: [employer] - Phone [phone] Fax [phone]  Pharmacy Filling the Rx: [id number] - [place], [place] - [address]  Filling Pharmacy Phone: [phone]  Filling Pharmacy Fax:    Start Date: 5/12/2025    Note: Due to record-high volumes, our turn-around time is taking longer than usual . We are currently 3  business days behind in the pools.   We are working diligently to submit all requests in a timely manner and in the order they are received. Please only flag TRUE URGENT requests as high priority to the pool at this time.   If you have questions on status of PA's,  please send a note/message in the active PA encounter and send back to the [facility] PA pool [[id number]].    If you have questions about the turn-around time or about our process, please reach out to our supervisor Marcie Zarate.   Thank you!   RPPA (Retail Pharmacy Prior Authorization) team    [id number]

## 2025-05-13 NOTE — TELEPHONE ENCOUNTER
PRIOR AUTHORIZATION DENIED    Medication: WEGOVY 0.25 MG/0.5ML SC SOAJ  Insurance Company: [employer] - Phone [phone] Fax [phone]  Denial Date: 5/13/2025  Denial Reason(s):             Appeal Information:         Patient Notified: No

## 2025-05-14 ENCOUNTER — ANCILLARY PROCEDURE (OUTPATIENT)
Dept: ULTRASOUND IMAGING | Facility: CLINIC | Age: 25
End: 2025-05-14
Attending: FAMILY MEDICINE
Payer: COMMERCIAL

## 2025-05-14 DIAGNOSIS — N91.2 AMENORRHEA: ICD-10-CM

## 2025-05-14 DIAGNOSIS — L68.0 HIRSUTISM: ICD-10-CM

## 2025-05-14 PROCEDURE — 76830 TRANSVAGINAL US NON-OB: CPT | Performed by: OBSTETRICS & GYNECOLOGY

## 2025-05-14 PROCEDURE — 76856 US EXAM PELVIC COMPLETE: CPT | Performed by: OBSTETRICS & GYNECOLOGY

## 2025-05-14 RX ORDER — PHENTERMINE HYDROCHLORIDE 15 MG/1
15 CAPSULE ORAL EVERY MORNING
Qty: 30 CAPSULE | Refills: 0 | Status: SHIPPED | OUTPATIENT
Start: 2025-05-14

## 2025-05-14 NOTE — TELEPHONE ENCOUNTER
Please call patient.  Advise her that her insurance company has denied the wegovy.  They want proof that she has tried diet, exercise and phentermine for 3 months prior to wegovy.  I sent in a prescription for this. Phentermine is a stimulant so should be taken daily in the morning. If short of breath, heart beating fast or abnormally, headaches, or other concerns while on meds, stop it right away. Virtual visit 1 month needed to see how it works.      Oleksandr Bernal, DO

## 2025-05-14 NOTE — TELEPHONE ENCOUNTER
Attempted to call pt. Message was left requesting a call back to the clinic. Pt was also sent a [employer] message. Thanks    Ashleigh Resendez RN  [facility]

## 2025-05-21 ENCOUNTER — OFFICE VISIT (OUTPATIENT)
Dept: OBGYN | Facility: CLINIC | Age: 25
End: 2025-05-21
Attending: FAMILY MEDICINE
Payer: COMMERCIAL

## 2025-05-21 VITALS
DIASTOLIC BLOOD PRESSURE: 78 MMHG | OXYGEN SATURATION: 100 % | HEART RATE: 86 BPM | BODY MASS INDEX: 53.47 KG/M2 | WEIGHT: 293 LBS | SYSTOLIC BLOOD PRESSURE: 128 MMHG

## 2025-05-21 DIAGNOSIS — N91.2 AMENORRHEA: ICD-10-CM

## 2025-05-21 DIAGNOSIS — E28.2 PCOS (POLYCYSTIC OVARIAN SYNDROME): Primary | ICD-10-CM

## 2025-05-21 DIAGNOSIS — L68.0 HIRSUTISM: ICD-10-CM

## 2025-05-21 PROCEDURE — 3074F SYST BP LT 130 MM HG: CPT | Performed by: OBSTETRICS & GYNECOLOGY

## 2025-05-21 PROCEDURE — 3078F DIAST BP <80 MM HG: CPT | Performed by: OBSTETRICS & GYNECOLOGY

## 2025-05-21 PROCEDURE — 99204 OFFICE O/P NEW MOD 45 MIN: CPT | Performed by: OBSTETRICS & GYNECOLOGY

## 2025-05-21 RX ORDER — MEDROXYPROGESTERONE ACETATE 10 MG
10 TABLET ORAL DAILY
Qty: 10 TABLET | Refills: 4 | Status: SHIPPED | OUTPATIENT
Start: 2025-05-21 | End: 2025-05-31

## 2025-05-21 NOTE — Clinical Note
Hi Dr. Bernal,   Thanks for sending Korin for a consult. It was very enjoyable! I think she's on a great path right now and hopefully medical weight management can be helpful for her prior to pregnancy.  Let me know if you have any questions for me.  She does have PCOS.  Airam Black MD/[facility]

## 2025-05-21 NOTE — PROGRESS NOTES
Assessment & Plan     Hirsutism  PCOS  - Ob/Gyn  Referral    Amenorrhea  PCOS  - Ob/Gyn  Referral    PCOS (polycystic ovarian syndrome)  - Meets criteria by amenorrhea and symptoms of testosterone excess  - Discussed concerns related to PCOS   1. Metabolic syndrome  Abdominal obesity, defined as a waist circumference >=88 cm (35 in) in women. PRESENT  Serum triglycerides >=150 mg/dL (1.7 mmol/L) or drug treatment for elevated triglycerides ABSENT  Serum high-density lipoprotein (HDL) cholesterol <50 mg/dL (1.3 mmol/L) in women PRESENT  Blood pressure >=130/85 mmHg or drug treatment for elevated blood pressure ABSENT  Fasting plasma glucose (FPG) >=100 mg/dL (5.6 mmol/L) or drug treatment for elevated blood glucose. PRESENT    TSH: normal     2. Cosmetic   - facial hair, present   - acne, absent   - hair loss over crown, absent   - obesity present      3. Endometrial cancer    - discussed risk if chronic anovulation    - needs period at least every 3 months, Discussed proverua     4. Fertility   - discussed may need ovulation induction. For now working on weight loss. Discussed periods may spontaneously resume with even 5-10% weight loss. I support medical weight management. Discussed stopping medication prior to pregnancy and starting folic acid. Discussed benefits of weight loss prior to pregnancy, including possible resolution of prediabetes and reduction of complications of pregnancy.     - medroxyPROGESTERone (PROVERA) 10 MG tablet; Take 1 tablet (10 mg) by mouth daily for 10 days. Continue to repeat this every time she has gone 3 months without a period.       Continue smoking cessation efforts        Nicotine/Tobacco Cessation  She reports that she has been smoking cigarettes. She has been exposed to tobacco smoke. She has never used smokeless tobacco.  Nicotine/Tobacco Cessation Plan  Self help information given to patient      BMI  Estimated body mass index is 53.47 kg/m  as calculated  "from the following:    Height as of 5/7/25: 1.68 m (5' 6.14\").    Weight as of this encounter: 150.9 kg (332 lb 11.2 oz).             Bipin Langley is a 24 year old, presenting for the following health issues:  Fertility    HPI    Presents for discussion of fertility and PCOS.   Patient's last menstrual period was 01/17/2025 (exact date).   Period before that was August.   Feels like she gets symptoms of period, but doesn't get the perriod.   Has had long history of irregular periods.   They did regulate for about 3 years  About 2 years ago periods stopped.  Has tried ovulation testing. Did not get positive tests.     Has had problems without facial hair growth  Shaves frequently to keep facial growth away. Thick and dark.     Tried to get wegovy started.   PA needs to try phentermine first for at least 3 months.   Recently diagnosed with prediabetes  Met with nutritionist  Working to hit a calorie deficit. Making sure to get.     Working on smoking cessation by cutting back.   Working her way there.   Now about 1/4.       Past Medical History:   Diagnosis Date    Abnormal Pap smear of cervix 01/21/2020    See problem list    Asthma     Cervical high risk HPV (human papillomavirus) test positive 03/12/2021    Morbid obesity (H)     Tobacco use        Past Surgical History:   Procedure Laterality Date    GALLBLADDER SURGERY  2001    HC REMOVE TONSILS/ADENOIDS,<11 Y/O  2005    wisdom Bilateral 2015    All 4 removed       Family History   Problem Relation Age of Onset    Asthma Mother     Kidney Disease Father     Breast Cancer Maternal Grandmother 47        47 at first diagnosis, had mastectomy; returned 12 years later with lymph involvement    Lymphoma Maternal Grandmother     Uterine Cancer Cousin 30       Social History     Socioeconomic History    Marital status: Single     Spouse name: Not on file    Number of children: Not on file    Years of education: Not on file    Highest education level: Not on file "   Occupational History    Occupation: teacher at [employer]   Tobacco Use    Smoking status: Every Day     Current packs/day: 0.50     Types: Cigarettes     Passive exposure: Current    Smokeless tobacco: Never   Vaping Use    Vaping status: Former    Substances: THC    Devices: Disposable    Passive vaping exposure: Yes   Substance and Sexual Activity    Alcohol use: Yes    Drug use: Never    Sexual activity: Yes     Partners: Male     Birth control/protection: None   Other Topics Concern    Not on file   Social History Narrative    Not on file     Social Drivers of Health     Financial Resource Strain: Low Risk  (10/24/2024)    Financial Resource Strain     Within the past 12 months, have you or your family members you live with been unable to get utilities (heat, electricity) when it was really needed?: No   Food Insecurity: Low Risk  (10/24/2024)    Food Insecurity     Within the past 12 months, did you worry that your food would run out before you got money to buy more?: No     Within the past 12 months, did the food you bought just not last and you didn t have money to get more?: No   Transportation Needs: Low Risk  (10/24/2024)    Transportation Needs     Within the past 12 months, has lack of transportation kept you from medical appointments, getting your medicines, non-medical meetings or appointments, work, or from getting things that you need?: No   Physical Activity: Sufficiently Active (10/24/2024)    Exercise Vital Sign     Days of Exercise per Week: 5 days     Minutes of Exercise per Session: 30 min   Stress: No Stress Concern Present (10/24/2024)    [place] [employer] - Occupational Stress Questionnaire     Feeling of Stress : Not at all   Social Connections: Unknown (10/24/2024)    Social Connection and Isolation Panel [NHANES]     Frequency of Communication with Friends and Family: Not on file     Frequency of Social Gatherings with Friends and Family: Twice a week      Attends Yarsanism Services: Not on file     Active Member of Clubs or Organizations: Not on file     Attends Club or Organization Meetings: Not on file     Marital Status: Not on file   Interpersonal Safety: Low Risk  (10/24/2024)    Interpersonal Safety     Do you feel physically and emotionally safe where you currently live?: Yes     Within the past 12 months, have you been hit, slapped, kicked or otherwise physically hurt by someone?: No     Within the past 12 months, have you been humiliated or emotionally abused in other ways by your partner or ex-partner?: No   Housing Stability: Low Risk  (10/24/2024)    Housing Stability     Do you have housing? : Yes     Are you worried about losing your housing?: No       Current Outpatient Medications   Medication Sig Dispense Refill    albuterol (PROAIR HFA/PROVENTIL HFA/VENTOLIN HFA) 108 (90 Base) MCG/ACT inhaler Inhale 2 puffs into the lungs every 4 hours as needed for shortness of breath or wheezing 18 g 0    fluticasone (FLONASE) 50 MCG/ACT nasal spray Spray 2 sprays into both nostrils daily 16 g 3    fluticasone-salmeterol (ADVAIR HFA) 115-21 MCG/ACT inhaler Inhale 1 puff into the lungs 2 times daily 12 g 10    ibuprofen (ADVIL/MOTRIN) 200 MG capsule Take 200 mg by mouth every 4 hours as needed for fever      phentermine 15 MG capsule Take 1 capsule (15 mg) by mouth every morning. 30 capsule 0    semaglutide-weight management (WEGOVY) 0.25 MG/0.5ML pen Inject 0.5 mLs (0.25 mg) subcutaneously once a week. 2 mL 0    triamcinolone (KENALOG) 0.1 % external ointment Apply topically 2 times daily As needed for dry skin 30 g 3    ciprofloxacin-dexAMETHasone (CIPRODEX) 0.3-0.1 % otic suspension Place 4 drops into both ears 2 times daily. (Patient not taking: Reported on 5/21/2025) 7.5 mL 2     No current facility-administered medications for this visit.          Allergies   Allergen Reactions    Amoxicillin-Pot Clavulanate Hives           Review of Systems  Constitutional,  HEENT, cardiovascular, pulmonary, gi and gu systems are negative, except as otherwise noted.      Objective    /78 (BP Location: Left arm, Patient Position: Sitting, Cuff Size: Adult Regular)   Pulse 86   Wt (!) 150.9 kg (332 lb 11.2 oz)   LMP 01/17/2025 (Exact Date)   SpO2 100%   BMI 53.47 kg/m    Body mass index is 53.47 kg/m .  Physical Exam   GENERAL: alert and no distress  NECK: no adenopathy, no asymmetry, masses, or scars  ABDOMEN: soft, nontender, no hepatosplenomegaly, no masses and bowel sounds normal  MS: no gross musculoskeletal defects noted, no edema  PSYCH: mentation appears normal, affect normal/bright    Gynecological Ultrasound Report  Pelvic U/S - Transabdominal and Transvaginal   [facility]  Referring Provider: Oleksandr Bernal DO  Sonographer:  Jason Taveras RDMS  Indication: Hirsutism, amenorrhea  LMP: 01/17/2025 (Menstrual status: Irregular Periods).     Gynecological Ultrasonography:   Uterus: anteverted. Contour is smooth/regular.  Size: 7.1 x 3.2 x 2.9 cm  Endometrium: Thickness Total 9.4 mm     Right Ovary: 3.7 x 2.1 x 2.8 cm. Wnl  Left Ovary: 2.3 x 2.2 x 3.5 cm. Wnl  Cul de Sac Free Fluid: No free fluid     Technique: Transvaginal Imaging performed  Transabdominal Imaging performed  3D imaging performed     Impression:      The uterus and ovaries were visualized and no abnormalities were seen.     Alejandra White MD    Component      Latest Ref Rng 6/4/2021  11:09 AM   Testosterone Total      8 - 60 ng/dL 56    Prolactin      3 - 27 ug/L 5    TSH      0.40 - 4.00 mU/L 0.90    FSH      IU/L 6.0    DHEA Sulfate      35 - 430 ug/dL 363        Component      Latest Ref Rng 10/24/2024  12:04 PM   Cholesterol      <200 mg/dL 174    Triglycerides      <150 mg/dL 121    HDL Cholesterol      >=50 mg/dL 39 (L)    LDL Cholesterol Calculated      <100 mg/dL 111 (H)    Non HDL Cholesterol      <130 mg/dL 135 (H)    Patient Fasting? No     Estimated Average Glucose      <117 mg/dL 120 (H)    Hemoglobin A1C      0.0 - 5.6 % 5.8 (H)    TSH      0.30 - 4.20 uIU/mL 2.11       Legend:  (L) Low  (H) High        Signed Electronically by: Airam Black MD

## 2025-06-16 ENCOUNTER — MYC REFILL (OUTPATIENT)
Dept: FAMILY MEDICINE | Facility: CLINIC | Age: 25
End: 2025-06-16
Payer: COMMERCIAL

## 2025-06-16 DIAGNOSIS — E66.813 CLASS 3 SEVERE OBESITY WITHOUT SERIOUS COMORBIDITY WITH BODY MASS INDEX (BMI) OF 50.0 TO 59.9 IN ADULT, UNSPECIFIED OBESITY TYPE (H): ICD-10-CM

## 2025-06-17 RX ORDER — PHENTERMINE HYDROCHLORIDE 15 MG/1
15 CAPSULE ORAL EVERY MORNING
Qty: 30 CAPSULE | Refills: 0 | Status: SHIPPED | OUTPATIENT
Start: 2025-06-17

## 2025-06-26 ENCOUNTER — OFFICE VISIT (OUTPATIENT)
Dept: FAMILY MEDICINE | Facility: CLINIC | Age: 25
End: 2025-06-26
Payer: COMMERCIAL

## 2025-06-26 VITALS
WEIGHT: 293 LBS | SYSTOLIC BLOOD PRESSURE: 119 MMHG | HEIGHT: 66 IN | TEMPERATURE: 98 F | HEART RATE: 76 BPM | BODY MASS INDEX: 47.09 KG/M2 | DIASTOLIC BLOOD PRESSURE: 75 MMHG | OXYGEN SATURATION: 98 %

## 2025-06-26 DIAGNOSIS — R19.7 DIARRHEA, UNSPECIFIED TYPE: Primary | ICD-10-CM

## 2025-06-26 DIAGNOSIS — E66.813 CLASS 3 SEVERE OBESITY WITHOUT SERIOUS COMORBIDITY WITH BODY MASS INDEX (BMI) OF 50.0 TO 59.9 IN ADULT, UNSPECIFIED OBESITY TYPE (H): ICD-10-CM

## 2025-06-26 DIAGNOSIS — Z11.3 ROUTINE SCREENING FOR STI (SEXUALLY TRANSMITTED INFECTION): ICD-10-CM

## 2025-06-26 LAB
ALBUMIN SERPL BCG-MCNC: 4.2 G/DL (ref 3.5–5.2)
ALP SERPL-CCNC: 67 U/L (ref 40–150)
ALT SERPL W P-5'-P-CCNC: 28 U/L (ref 0–50)
ANION GAP SERPL CALCULATED.3IONS-SCNC: 11 MMOL/L (ref 7–15)
AST SERPL W P-5'-P-CCNC: 29 U/L (ref 0–45)
BILIRUB SERPL-MCNC: 0.3 MG/DL
BUN SERPL-MCNC: 8 MG/DL (ref 6–20)
C TRACH DNA SPEC QL PROBE+SIG AMP: NEGATIVE
CALCIUM SERPL-MCNC: 9.6 MG/DL (ref 8.8–10.4)
CHLORIDE SERPL-SCNC: 101 MMOL/L (ref 98–107)
CREAT SERPL-MCNC: 0.64 MG/DL (ref 0.51–0.95)
EGFRCR SERPLBLD CKD-EPI 2021: >90 ML/MIN/1.73M2
ERYTHROCYTE [DISTWIDTH] IN BLOOD BY AUTOMATED COUNT: 13 % (ref 10–15)
EST. AVERAGE GLUCOSE BLD GHB EST-MCNC: 111 MG/DL
FOLATE SERPL-MCNC: 11.4 NG/ML (ref 4.6–34.8)
GLUCOSE SERPL-MCNC: 99 MG/DL (ref 70–99)
HBA1C MFR BLD: 5.5 % (ref 0–5.6)
HCO3 SERPL-SCNC: 24 MMOL/L (ref 22–29)
HCT VFR BLD AUTO: 43.8 % (ref 35–47)
HGB BLD-MCNC: 14.2 G/DL (ref 11.7–15.7)
HIV 1+2 AB+HIV1 P24 AG SERPL QL IA: NONREACTIVE
MCH RBC QN AUTO: 27.8 PG (ref 26.5–33)
MCHC RBC AUTO-ENTMCNC: 32.4 G/DL (ref 31.5–36.5)
MCV RBC AUTO: 86 FL (ref 78–100)
N GONORRHOEA DNA SPEC QL NAA+PROBE: NEGATIVE
PLATELET # BLD AUTO: 377 10E3/UL (ref 150–450)
POTASSIUM SERPL-SCNC: 4.3 MMOL/L (ref 3.4–5.3)
PROT SERPL-MCNC: 6.7 G/DL (ref 6.4–8.3)
RBC # BLD AUTO: 5.11 10E6/UL (ref 3.8–5.2)
SODIUM SERPL-SCNC: 136 MMOL/L (ref 135–145)
SPECIMEN TYPE: NORMAL
T PALLIDUM AB SER QL: NONREACTIVE
VIT B12 SERPL-MCNC: 265 PG/ML (ref 232–1245)
WBC # BLD AUTO: 7.8 10E3/UL (ref 4–11)

## 2025-06-26 RX ORDER — PHENTERMINE HYDROCHLORIDE 30 MG/1
30 CAPSULE ORAL EVERY MORNING
Qty: 30 CAPSULE | Refills: 1 | Status: SHIPPED | OUTPATIENT
Start: 2025-06-26

## 2025-06-26 ASSESSMENT — PAIN SCALES - GENERAL: PAINLEVEL_OUTOF10: NO PAIN (0)

## 2025-06-26 NOTE — PROGRESS NOTES
"  Assessment & Plan     Class 3 severe obesity without serious comorbidity with body mass index (BMI) of 50.0 to 59.9 in adult, unspecified obesity type (H):  - Phentermine is effective in curbing appetite and aiding weight loss. No heart racing or adverse effects reported.  - Increase phentermine dose by taking two pills simultaneously each morning. Continue monitoring weight and appetite.    Diarrhea, unspecified type:  - Likely related to gallbladder removal at age 1. Differential diagnosis includes inflammatory bowel disease or Crohn's disease.  - Conduct blood, urine, and stool tests to rule out other causes of diarrhea.    Routine screening for STI (sexually transmitted infection):  - Routine screening requested.  - Perform blood and urine tests for STI screening.    Consent was obtained from the patient to use an AI documentation tool in the creation of this note.          BMI  Estimated body mass index is 52.53 kg/m  as calculated from the following:    Height as of this encounter: 1.683 m (5' 6.26\").    Weight as of this encounter: 148.8 kg (328 lb).     Bipin Langley is a 24 year old, presenting for the following health issues:  Follow Up and Abdominal Pain        6/26/2025     8:21 AM   Additional Questions   Roomed by Jeff DUARTE     History of Present Illness       Reason for visit:  Follow-up appointment    She eats 2-3 servings of fruits and vegetables daily.She consumes 1 sweetened beverage(s) daily.She exercises with enough effort to increase her heart rate 30 to 60 minutes per day.  She exercises with enough effort to increase her heart rate 4 days per week.   She is taking medications regularly.   Korin Jason, 24 years    Polycystic Ovarian Syndrome (PCOS)  - Diagnosed with PCOS despite absence of obvious cysts on ultrasound    Diarrhea and Stomach Problems  - Persistent stomach pain and diarrhea, with food passing quickly through the digestive system  - Symptoms occur with all foods, " "including fatty foods  - History of gallbladder removal at age 1  - Avoidance of dairy due to assumption of lactose intolerance    Weight Management  - Taking phentermine for appetite suppression  - Experienced lightheadedness and dizziness after not eating all day due to appetite suppression from phentermine        Objective    /75 (BP Location: Left arm, Patient Position: Sitting, Cuff Size: Adult Large)   Pulse 76   Temp 98  F (36.7  C) (Temporal)   Ht 1.683 m (5' 6.26\")   Wt (!) 148.8 kg (328 lb)   LMP 05/25/2025 (Exact Date)   SpO2 98%   BMI 52.53 kg/m    Body mass index is 52.53 kg/m .  Physical Exam  Constitutional:       General: She is not in acute distress.  Eyes:      General: No scleral icterus.  Pulmonary:      Effort: No respiratory distress.   Neurological:      Mental Status: She is alert.   Psychiatric:         Mood and Affect: Mood normal.         Behavior: Behavior normal.                Signed Electronically by: Oleksandr Bernal,     "

## 2025-06-28 ENCOUNTER — RESULTS FOLLOW-UP (OUTPATIENT)
Dept: URGENT CARE | Facility: URGENT CARE | Age: 25
End: 2025-06-28

## 2025-06-28 LAB
TTG IGA SER-ACNC: 0.2 U/ML
TTG IGG SER-ACNC: <0.6 U/ML

## 2025-08-12 ENCOUNTER — MYC REFILL (OUTPATIENT)
Dept: FAMILY MEDICINE | Facility: CLINIC | Age: 25
End: 2025-08-12
Payer: COMMERCIAL

## 2025-08-12 DIAGNOSIS — E66.813 CLASS 3 SEVERE OBESITY WITHOUT SERIOUS COMORBIDITY WITH BODY MASS INDEX (BMI) OF 50.0 TO 59.9 IN ADULT, UNSPECIFIED OBESITY TYPE (H): ICD-10-CM

## 2025-08-12 RX ORDER — PHENTERMINE HYDROCHLORIDE 30 MG/1
30 CAPSULE ORAL EVERY MORNING
Qty: 30 CAPSULE | Refills: 1 | Status: SHIPPED | OUTPATIENT
Start: 2025-08-12

## 2025-08-26 ENCOUNTER — TELEPHONE (OUTPATIENT)
Dept: FAMILY MEDICINE | Facility: CLINIC | Age: 25
End: 2025-08-26